# Patient Record
Sex: FEMALE | Race: BLACK OR AFRICAN AMERICAN | NOT HISPANIC OR LATINO | URBAN - METROPOLITAN AREA
[De-identification: names, ages, dates, MRNs, and addresses within clinical notes are randomized per-mention and may not be internally consistent; named-entity substitution may affect disease eponyms.]

---

## 2021-08-09 ENCOUNTER — EMERGENCY (EMERGENCY)
Facility: HOSPITAL | Age: 41
LOS: 1 days | Discharge: HOME | End: 2021-08-09
Attending: STUDENT IN AN ORGANIZED HEALTH CARE EDUCATION/TRAINING PROGRAM
Payer: COMMERCIAL

## 2021-08-09 VITALS
TEMPERATURE: 98 F | SYSTOLIC BLOOD PRESSURE: 103 MMHG | RESPIRATION RATE: 16 BRPM | DIASTOLIC BLOOD PRESSURE: 70 MMHG | WEIGHT: 139.99 LBS | HEIGHT: 62 IN | HEART RATE: 70 BPM | OXYGEN SATURATION: 100 %

## 2021-08-09 VITALS
DIASTOLIC BLOOD PRESSURE: 67 MMHG | RESPIRATION RATE: 16 BRPM | OXYGEN SATURATION: 99 % | HEART RATE: 58 BPM | SYSTOLIC BLOOD PRESSURE: 102 MMHG | TEMPERATURE: 99 F

## 2021-08-09 LAB
ALBUMIN SERPL ELPH-MCNC: 4 G/DL — SIGNIFICANT CHANGE UP (ref 3.3–5)
ALP SERPL-CCNC: 56 U/L — SIGNIFICANT CHANGE UP (ref 40–120)
ALT FLD-CCNC: 9 U/L — LOW (ref 10–45)
ANION GAP SERPL CALC-SCNC: 14 MMOL/L — SIGNIFICANT CHANGE UP (ref 5–17)
APPEARANCE UR: CLEAR — SIGNIFICANT CHANGE UP
AST SERPL-CCNC: 18 U/L — SIGNIFICANT CHANGE UP (ref 10–40)
BASOPHILS # BLD AUTO: 0.03 K/UL — SIGNIFICANT CHANGE UP (ref 0–0.2)
BASOPHILS NFR BLD AUTO: 0.4 % — SIGNIFICANT CHANGE UP (ref 0–2)
BILIRUB SERPL-MCNC: 0.6 MG/DL — SIGNIFICANT CHANGE UP (ref 0.2–1.2)
BILIRUB UR-MCNC: NEGATIVE — SIGNIFICANT CHANGE UP
BUN SERPL-MCNC: 7 MG/DL — SIGNIFICANT CHANGE UP (ref 7–23)
CALCIUM SERPL-MCNC: 9.3 MG/DL — SIGNIFICANT CHANGE UP (ref 8.4–10.5)
CHLORIDE SERPL-SCNC: 104 MMOL/L — SIGNIFICANT CHANGE UP (ref 96–108)
CO2 SERPL-SCNC: 19 MMOL/L — LOW (ref 22–31)
COLOR SPEC: SIGNIFICANT CHANGE UP
CREAT SERPL-MCNC: 0.72 MG/DL — SIGNIFICANT CHANGE UP (ref 0.5–1.3)
D DIMER BLD IA.RAPID-MCNC: 411 NG/ML DDU — HIGH
DIFF PNL FLD: NEGATIVE — SIGNIFICANT CHANGE UP
EOSINOPHIL # BLD AUTO: 0.05 K/UL — SIGNIFICANT CHANGE UP (ref 0–0.5)
EOSINOPHIL NFR BLD AUTO: 0.7 % — SIGNIFICANT CHANGE UP (ref 0–6)
GLUCOSE SERPL-MCNC: 82 MG/DL — SIGNIFICANT CHANGE UP (ref 70–99)
GLUCOSE UR QL: NEGATIVE — SIGNIFICANT CHANGE UP
HCG UR QL: NEGATIVE — SIGNIFICANT CHANGE UP
HCT VFR BLD CALC: 44.8 % — SIGNIFICANT CHANGE UP (ref 34.5–45)
HGB BLD-MCNC: 14.3 G/DL — SIGNIFICANT CHANGE UP (ref 11.5–15.5)
IMM GRANULOCYTES NFR BLD AUTO: 0.3 % — SIGNIFICANT CHANGE UP (ref 0–1.5)
KETONES UR-MCNC: NEGATIVE — SIGNIFICANT CHANGE UP
LEUKOCYTE ESTERASE UR-ACNC: NEGATIVE — SIGNIFICANT CHANGE UP
LYMPHOCYTES # BLD AUTO: 2.34 K/UL — SIGNIFICANT CHANGE UP (ref 1–3.3)
LYMPHOCYTES # BLD AUTO: 33.3 % — SIGNIFICANT CHANGE UP (ref 13–44)
MAGNESIUM SERPL-MCNC: 2.2 MG/DL — SIGNIFICANT CHANGE UP (ref 1.6–2.6)
MCHC RBC-ENTMCNC: 28.3 PG — SIGNIFICANT CHANGE UP (ref 27–34)
MCHC RBC-ENTMCNC: 31.9 GM/DL — LOW (ref 32–36)
MCV RBC AUTO: 88.5 FL — SIGNIFICANT CHANGE UP (ref 80–100)
MONOCYTES # BLD AUTO: 0.34 K/UL — SIGNIFICANT CHANGE UP (ref 0–0.9)
MONOCYTES NFR BLD AUTO: 4.8 % — SIGNIFICANT CHANGE UP (ref 2–14)
NEUTROPHILS # BLD AUTO: 4.25 K/UL — SIGNIFICANT CHANGE UP (ref 1.8–7.4)
NEUTROPHILS NFR BLD AUTO: 60.5 % — SIGNIFICANT CHANGE UP (ref 43–77)
NITRITE UR-MCNC: NEGATIVE — SIGNIFICANT CHANGE UP
NRBC # BLD: 0 /100 WBCS — SIGNIFICANT CHANGE UP (ref 0–0)
PH UR: 7 — SIGNIFICANT CHANGE UP (ref 5–8)
PLATELET # BLD AUTO: 159 K/UL — SIGNIFICANT CHANGE UP (ref 150–400)
POTASSIUM SERPL-MCNC: 4.6 MMOL/L — SIGNIFICANT CHANGE UP (ref 3.5–5.3)
POTASSIUM SERPL-SCNC: 4.6 MMOL/L — SIGNIFICANT CHANGE UP (ref 3.5–5.3)
PROT SERPL-MCNC: 7.6 G/DL — SIGNIFICANT CHANGE UP (ref 6–8.3)
PROT UR-MCNC: NEGATIVE — SIGNIFICANT CHANGE UP
RBC # BLD: 5.06 M/UL — SIGNIFICANT CHANGE UP (ref 3.8–5.2)
RBC # FLD: 12.5 % — SIGNIFICANT CHANGE UP (ref 10.3–14.5)
SODIUM SERPL-SCNC: 137 MMOL/L — SIGNIFICANT CHANGE UP (ref 135–145)
SP GR SPEC: 1.01 — SIGNIFICANT CHANGE UP (ref 1.01–1.02)
TROPONIN T, HIGH SENSITIVITY RESULT: <6 NG/L — SIGNIFICANT CHANGE UP (ref 0–51)
TSH SERPL-MCNC: 0.87 UIU/ML — SIGNIFICANT CHANGE UP (ref 0.27–4.2)
UROBILINOGEN FLD QL: NEGATIVE — SIGNIFICANT CHANGE UP
WBC # BLD: 7.03 K/UL — SIGNIFICANT CHANGE UP (ref 3.8–10.5)
WBC # FLD AUTO: 7.03 K/UL — SIGNIFICANT CHANGE UP (ref 3.8–10.5)

## 2021-08-09 PROCEDURE — G1004: CPT

## 2021-08-09 PROCEDURE — 71275 CT ANGIOGRAPHY CHEST: CPT | Mod: ME

## 2021-08-09 PROCEDURE — 83735 ASSAY OF MAGNESIUM: CPT

## 2021-08-09 PROCEDURE — 85025 COMPLETE CBC W/AUTO DIFF WBC: CPT

## 2021-08-09 PROCEDURE — 81003 URINALYSIS AUTO W/O SCOPE: CPT

## 2021-08-09 PROCEDURE — 84443 ASSAY THYROID STIM HORMONE: CPT

## 2021-08-09 PROCEDURE — 85379 FIBRIN DEGRADATION QUANT: CPT

## 2021-08-09 PROCEDURE — 84484 ASSAY OF TROPONIN QUANT: CPT

## 2021-08-09 PROCEDURE — 93005 ELECTROCARDIOGRAM TRACING: CPT

## 2021-08-09 PROCEDURE — 99285 EMERGENCY DEPT VISIT HI MDM: CPT

## 2021-08-09 PROCEDURE — 71045 X-RAY EXAM CHEST 1 VIEW: CPT

## 2021-08-09 PROCEDURE — 71275 CT ANGIOGRAPHY CHEST: CPT | Mod: 26,ME

## 2021-08-09 PROCEDURE — 81025 URINE PREGNANCY TEST: CPT

## 2021-08-09 PROCEDURE — 71045 X-RAY EXAM CHEST 1 VIEW: CPT | Mod: 26

## 2021-08-09 PROCEDURE — 99284 EMERGENCY DEPT VISIT MOD MDM: CPT | Mod: 25

## 2021-08-09 PROCEDURE — 80053 COMPREHEN METABOLIC PANEL: CPT

## 2021-08-09 PROCEDURE — 93010 ELECTROCARDIOGRAM REPORT: CPT

## 2021-08-09 RX ORDER — RIVAROXABAN 15 MG-20MG
1 KIT ORAL
Qty: 42 | Refills: 0
Start: 2021-08-09 | End: 2021-08-29

## 2021-08-09 RX ADMIN — Medication 30 MILLILITER(S): at 13:26

## 2021-08-09 NOTE — ED ADULT NURSE NOTE - OBJECTIVE STATEMENT
42 yo presents to the ED from home. A&Ox4, ambulatory c/o dizziness, chest pain, palpitations with SOB x2 weeks, worsening the last 2 days. pt reports numerous episodes per day. pt reports symptoms present at rest and they are relieved on its own. pt reports R shoulder pain as well. pt denies trauma. no cardiac history. pt well appearing. 20G RAC. Patient undressed and placed into gown, call bell in hand and side rails up for safety. warm blanket provided, vital signs stable, pt in no acute distress.

## 2021-08-09 NOTE — ED PROVIDER NOTE - NSFOLLOWUPINSTRUCTIONS_ED_ALL_ED_FT
You were seen in the Emergency Department for chest pain and tachycardia. You were found to have multiple small blood clots in your lungs, which may be related to your oral contraceptive use, we started you on a blood thinner. Please continue to take the xarelto two times a day for the next 3 weeks. Your hematology doctor will adjust your dose.     1) Advance activity as tolerated.   2) Continue all previously prescribed medications as directed.    3) Follow up with your primary care physician and hematology doctor in 24-48 hours - take copies of your results. The discharge [id number] will call you te establish follow up appointments with the blood doctors and surgeons.  4) Return to the Emergency Department for worsening or persistent symptoms, and/or ANY NEW OR CONCERNING SYMPTOMS.

## 2021-08-09 NOTE — ED PROVIDER NOTE - NSPTACCESSSVCSAPPTDETAILS_ED_ALL_ED_FT
URGENT Hematology for multiple subsegmental PEs now on xarelto, and URGENT Vascular Surgery f/u for CT findings concerning for median arcuate ligament syndrome

## 2021-08-09 NOTE — ED PROVIDER NOTE - PROVIDER TOKENS
PROVIDER:[TOKEN:[[id number]],FOLLOWUP:[4-6 Days],ESTABLISHEDPATIENT:[T]] FREE:[LAST:[Chambers],FIRST:[Mary],PHONE:[phone]],FAX:[(   )    -],ADDRESS:[[address] [place] [address]],FOLLOWUP:[1-3 Days],ESTABLISHEDPATIENT:[T]]

## 2021-08-09 NOTE — ED PROVIDER NOTE - PROGRESS NOTE DETAILS
Gurmeet CRUZ: Pt with 2x subseg PE on CTA. 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 Findings discussed with patient, low risk by PESI, agreeable with plan for xarelto as outpt and close hematology follow up. Will f/u with vasc surgery for MALS. Gurmeet CRUZ: Pt with 2x subseg PE on CTA. 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 Findings discussed with patient, low risk by PESI, agreeable with plan for xarelto as outpt and close hematology follow up. Will f/u with vasc surgery for MALS.    Patient was given strict return precautions and told to return to ED or seek medical care if she developed any shortness of breath, worsening chest pain, worsening lightheadedness/dizziness, abdominal pain, vomiting, fevers, chills OR ANY OTHER CONCERNING SYMPTOMS. close follow up with hematology and her PCP was stressed and patient voiced understanding.

## 2021-08-09 NOTE — ED PROVIDER NOTE - CLINICAL SUMMARY MEDICAL DECISION MAKING FREE TEXT BOX
HORACE Ko, Attending: reviewed resident note.    Healthy 41 yoF, presenting with 2 weeks of chest pain, palpitations, dizziness, dyspnea. Sx intermittent. No obvious triggers. Resolve on own. Never had before. No smoking hx. No hx of VTE. No travel. Does take OCPs. Saw her PCP in June, no issues. No fever, cough, recent illness. Not Covid vaccinated, no prior hx of Covid illness.     vitals reassuring, EKG reassuring without concerning ST morphology   lungs cta, rrr, non lateralizing neuro exam, abd soft, non tender    doubt emergent pathology but will screen for arrythmia, ACS, PTX, PE (w/ dimer given OCPS). Possibly GI source.  Suspect ok for outpatient follow up with PCP if workup non actionable.

## 2021-08-09 NOTE — ED PROVIDER NOTE - PATIENT PORTAL LINK FT
You can access the FollowMyHealth Patient Portal offered by [facility] by registering at the following website: [email or web address]. By joining [employer]’s FollowMyHealth portal, you will also be able to view your health information using other applications (apps) compatible with our system.

## 2021-08-09 NOTE — ED PROVIDER NOTE - WR ORDER DATE AND TIME 1
Pt to ED c/o constipation x2 days. Pt c/o constant upper left abdominal pain and flank pain. Pt denies N/V. Bowel sounds auscultated in all quadrants. Pt a/o x4,  ambulatory, skin intact  
09-Aug-2021 12:53

## 2021-08-09 NOTE — ED PROVIDER NOTE - NS ED ROS FT
Gen: see HPI  Eyes: No eye irritation or discharge  ENT: No ear pain, congestion, sore throat  Resp: see HPI  Cardiovascular: see HPI  Gastroenteric: No nausea/vomiting, diarrhea, constipation  :  No change in urine output; no dysuria  MS: No joint or muscle pain  Skin: No rashes  Neuro: No headache; no abnormal movements  Remainder negative, except as per the HPI

## 2021-08-09 NOTE — ED PROVIDER NOTE - PHYSICAL EXAMINATION
PHYSICAL EXAM:  GENERAL: Sitting comfortable in bed, in no acute distress  HENMT: Atraumatic, moist mucous membranes, no oropharyngeal exudates or vesicles, uvula is midline EYES: Clear bilaterally, PERRL, EOMs intact b/l  HEART: RRR, S1/S2, no murmur/gallops/rubs  RESPIRATORY: Clear to auscultation bilaterally, no wheezes/rhonchi/rales  ABDOMEN: +BS, soft, nontender, nondistended  EXTREMITIES: No lower extremity edema, +2 radial pulses b/l  NEURO:  A&Ox4, no focal motor deficits or sensory deficits   Heme/LYMPH: No ecchymosis or bruising, no anterior/posterior cervical or supraclavicular LAD  SKIN:  Skin normal color for race, warm, dry and intact. No evidence of rash.

## 2021-08-09 NOTE — ED PROVIDER NOTE - CARE PROVIDER_API CALL
ASHIA GREWAL  [id number]  [address] [place] [address]  Phone: [phone]  Fax: [phone]  Established Patient  Follow Up Time: 4-6 Days   Mary Chambers  [address] [place] [address]  Phone: [phone]  Fax: (   )    -  Established Patient  Follow Up Time: 1-3 Days

## 2021-08-16 ENCOUNTER — TRANSCRIPTION ENCOUNTER (OUTPATIENT)
Age: 41
End: 2021-08-16

## 2021-08-20 PROBLEM — Z00.00 ENCOUNTER FOR PREVENTIVE HEALTH EXAMINATION: Status: ACTIVE | Noted: 2021-08-20

## 2021-08-29 ENCOUNTER — EMERGENCY (EMERGENCY)
Facility: HOSPITAL | Age: 41
LOS: 1 days | Discharge: HOME | End: 2021-08-29
Attending: STUDENT IN AN ORGANIZED HEALTH CARE EDUCATION/TRAINING PROGRAM
Payer: COMMERCIAL

## 2021-08-29 VITALS
SYSTOLIC BLOOD PRESSURE: 99 MMHG | OXYGEN SATURATION: 100 % | DIASTOLIC BLOOD PRESSURE: 57 MMHG | WEIGHT: 138.01 LBS | TEMPERATURE: 98 F | HEART RATE: 70 BPM | HEIGHT: 62 IN | RESPIRATION RATE: 18 BRPM

## 2021-08-29 LAB
ALBUMIN SERPL ELPH-MCNC: 3.8 G/DL — SIGNIFICANT CHANGE UP (ref 3.3–5)
ALP SERPL-CCNC: 44 U/L — SIGNIFICANT CHANGE UP (ref 40–120)
ALT FLD-CCNC: 7 U/L — LOW (ref 10–45)
ANION GAP SERPL CALC-SCNC: 13 MMOL/L — SIGNIFICANT CHANGE UP (ref 5–17)
APTT BLD: 35.1 SEC — SIGNIFICANT CHANGE UP (ref 27.5–35.5)
AST SERPL-CCNC: 17 U/L — SIGNIFICANT CHANGE UP (ref 10–40)
BASOPHILS # BLD AUTO: 0.03 K/UL — SIGNIFICANT CHANGE UP (ref 0–0.2)
BASOPHILS NFR BLD AUTO: 0.5 % — SIGNIFICANT CHANGE UP (ref 0–2)
BILIRUB SERPL-MCNC: 0.3 MG/DL — SIGNIFICANT CHANGE UP (ref 0.2–1.2)
BLD GP AB SCN SERPL QL: NEGATIVE — SIGNIFICANT CHANGE UP
BUN SERPL-MCNC: 8 MG/DL — SIGNIFICANT CHANGE UP (ref 7–23)
CALCIUM SERPL-MCNC: 9.2 MG/DL — SIGNIFICANT CHANGE UP (ref 8.4–10.5)
CHLORIDE SERPL-SCNC: 106 MMOL/L — SIGNIFICANT CHANGE UP (ref 96–108)
CO2 SERPL-SCNC: 20 MMOL/L — LOW (ref 22–31)
CREAT SERPL-MCNC: 0.76 MG/DL — SIGNIFICANT CHANGE UP (ref 0.5–1.3)
EOSINOPHIL # BLD AUTO: 0.13 K/UL — SIGNIFICANT CHANGE UP (ref 0–0.5)
EOSINOPHIL NFR BLD AUTO: 2.1 % — SIGNIFICANT CHANGE UP (ref 0–6)
GLUCOSE SERPL-MCNC: 84 MG/DL — SIGNIFICANT CHANGE UP (ref 70–99)
HCG SERPL-ACNC: <2 MIU/ML — SIGNIFICANT CHANGE UP
HCT VFR BLD CALC: 31.5 % — LOW (ref 34.5–45)
HCT VFR BLD CALC: 33.1 % — LOW (ref 34.5–45)
HGB BLD-MCNC: 10.1 G/DL — LOW (ref 11.5–15.5)
HGB BLD-MCNC: 10.6 G/DL — LOW (ref 11.5–15.5)
IMM GRANULOCYTES NFR BLD AUTO: 0.3 % — SIGNIFICANT CHANGE UP (ref 0–1.5)
INR BLD: 2.75 RATIO — HIGH (ref 0.88–1.16)
LYMPHOCYTES # BLD AUTO: 2.09 K/UL — SIGNIFICANT CHANGE UP (ref 1–3.3)
LYMPHOCYTES # BLD AUTO: 33 % — SIGNIFICANT CHANGE UP (ref 13–44)
MCHC RBC-ENTMCNC: 28.1 PG — SIGNIFICANT CHANGE UP (ref 27–34)
MCHC RBC-ENTMCNC: 28.2 PG — SIGNIFICANT CHANGE UP (ref 27–34)
MCHC RBC-ENTMCNC: 32 GM/DL — SIGNIFICANT CHANGE UP (ref 32–36)
MCHC RBC-ENTMCNC: 32.1 GM/DL — SIGNIFICANT CHANGE UP (ref 32–36)
MCV RBC AUTO: 87.5 FL — SIGNIFICANT CHANGE UP (ref 80–100)
MCV RBC AUTO: 88 FL — SIGNIFICANT CHANGE UP (ref 80–100)
MONOCYTES # BLD AUTO: 0.46 K/UL — SIGNIFICANT CHANGE UP (ref 0–0.9)
MONOCYTES NFR BLD AUTO: 7.3 % — SIGNIFICANT CHANGE UP (ref 2–14)
NEUTROPHILS # BLD AUTO: 3.6 K/UL — SIGNIFICANT CHANGE UP (ref 1.8–7.4)
NEUTROPHILS NFR BLD AUTO: 56.8 % — SIGNIFICANT CHANGE UP (ref 43–77)
NRBC # BLD: 0 /100 WBCS — SIGNIFICANT CHANGE UP (ref 0–0)
NRBC # BLD: 0 /100 WBCS — SIGNIFICANT CHANGE UP (ref 0–0)
PLATELET # BLD AUTO: 208 K/UL — SIGNIFICANT CHANGE UP (ref 150–400)
PLATELET # BLD AUTO: 210 K/UL — SIGNIFICANT CHANGE UP (ref 150–400)
POTASSIUM SERPL-MCNC: 4.6 MMOL/L — SIGNIFICANT CHANGE UP (ref 3.5–5.3)
POTASSIUM SERPL-SCNC: 4.6 MMOL/L — SIGNIFICANT CHANGE UP (ref 3.5–5.3)
PROT SERPL-MCNC: 6.8 G/DL — SIGNIFICANT CHANGE UP (ref 6–8.3)
PROTHROM AB SERPL-ACNC: 31.4 SEC — HIGH (ref 10.6–13.6)
RBC # BLD: 3.6 M/UL — LOW (ref 3.8–5.2)
RBC # BLD: 3.76 M/UL — LOW (ref 3.8–5.2)
RBC # FLD: 12.2 % — SIGNIFICANT CHANGE UP (ref 10.3–14.5)
RBC # FLD: 12.4 % — SIGNIFICANT CHANGE UP (ref 10.3–14.5)
RH IG SCN BLD-IMP: POSITIVE — SIGNIFICANT CHANGE UP
SARS-COV-2 RNA SPEC QL NAA+PROBE: SIGNIFICANT CHANGE UP
SODIUM SERPL-SCNC: 139 MMOL/L — SIGNIFICANT CHANGE UP (ref 135–145)
WBC # BLD: 5.15 K/UL — SIGNIFICANT CHANGE UP (ref 3.8–10.5)
WBC # BLD: 6.33 K/UL — SIGNIFICANT CHANGE UP (ref 3.8–10.5)
WBC # FLD AUTO: 5.15 K/UL — SIGNIFICANT CHANGE UP (ref 3.8–10.5)
WBC # FLD AUTO: 6.33 K/UL — SIGNIFICANT CHANGE UP (ref 3.8–10.5)

## 2021-08-29 PROCEDURE — 93975 VASCULAR STUDY: CPT | Mod: 26

## 2021-08-29 PROCEDURE — 99218: CPT

## 2021-08-29 PROCEDURE — 93010 ELECTROCARDIOGRAM REPORT: CPT

## 2021-08-29 PROCEDURE — 76830 TRANSVAGINAL US NON-OB: CPT | Mod: 26

## 2021-08-29 RX ORDER — KETOROLAC TROMETHAMINE 30 MG/ML
15 SYRINGE (ML) INJECTION ONCE
Refills: 0 | Status: DISCONTINUED | OUTPATIENT
Start: 2021-08-29 | End: 2021-08-29

## 2021-08-29 RX ORDER — ACETAMINOPHEN 500 MG
975 TABLET ORAL EVERY 6 HOURS
Refills: 0 | Status: DISCONTINUED | OUTPATIENT
Start: 2021-08-29 | End: 2021-09-02

## 2021-08-29 RX ORDER — RIVAROXABAN 15 MG-20MG
15 KIT ORAL
Refills: 0 | Status: DISCONTINUED | OUTPATIENT
Start: 2021-08-29 | End: 2021-09-02

## 2021-08-29 RX ORDER — SODIUM CHLORIDE 9 MG/ML
1000 INJECTION INTRAMUSCULAR; INTRAVENOUS; SUBCUTANEOUS ONCE
Refills: 0 | Status: COMPLETED | OUTPATIENT
Start: 2021-08-29 | End: 2021-08-29

## 2021-08-29 RX ORDER — ACETAMINOPHEN 500 MG
975 TABLET ORAL ONCE
Refills: 0 | Status: COMPLETED | OUTPATIENT
Start: 2021-08-29 | End: 2021-08-29

## 2021-08-29 RX ADMIN — Medication 975 MILLIGRAM(S): at 15:53

## 2021-08-29 RX ADMIN — SODIUM CHLORIDE 1000 MILLILITER(S): 9 INJECTION INTRAMUSCULAR; INTRAVENOUS; SUBCUTANEOUS at 15:53

## 2021-08-29 RX ADMIN — RIVAROXABAN 15 MILLIGRAM(S): KIT at 22:07

## 2021-08-29 RX ADMIN — Medication 975 MILLIGRAM(S): at 19:00

## 2021-08-29 RX ADMIN — SODIUM CHLORIDE 1000 MILLILITER(S): 9 INJECTION INTRAMUSCULAR; INTRAVENOUS; SUBCUTANEOUS at 19:00

## 2021-08-29 NOTE — ED CDU PROVIDER DISPOSITION NOTE - PATIENT PORTAL LINK FT
You can access the FollowMyHealth Patient Portal offered by [facility] by registering at the following website: [email or web address]. By joining [employer]’s FollowMyHealth portal, you will also be able to view your health information using other applications (apps) compatible with our system.

## 2021-08-29 NOTE — ED CDU PROVIDER DISPOSITION NOTE - CLINICAL COURSE
41 year old female with PMHx recently diagnosed PE (8/9/2021) on Xarelto presents to ED with heavy vaginal bleeding for 3 weeks which has been gradually worsening. Pt reports over past day has had to change pad every 2 hours and has been passing very large blood clots. She also endorses intermittent abd cramping. Earlier today  around 12pm  pt reports syncopal episode as well describing she passed out after BM while sitting a toilet and fell down on the floor. Prior to event states she felt light headed sensation and intermittent abd cramping.  Denies injury or pain related to fall. Pt stated she had LMP-11/2020 and started birth control pill in March 2021 for premenopause symptoms. She is still taking her OCP despite having the PE.  Denies fevers, chills, HA, chest pain, sob, n/v/d. PMD- Dr. Mary Chambers   GYN- Dr. Gisell Mari  In ED pt H/H 10.6/33.1 from 14.3/44.8 on 8/9. PT 31.4 and INR 2.75. ED consulted GYN given pt needs a/c and is actively bleeding with drop in H/H. Ordered for TVUS for further eval. Pt accepted to CDU to trend H/H and for further recommendations with GYN  IN CDU *** 41 year old female with PMHx recently diagnosed PE (8/9/2021) on Xarelto presents to ED with heavy vaginal bleeding for 3 weeks which has been gradually worsening. Pt reports over past day has had to change pad every 2 hours and has been passing very large blood clots. She also endorses intermittent abd cramping. Earlier today  around 12pm  pt reports syncopal episode as well describing she passed out after BM while sitting a toilet and fell down on the floor. Prior to event states she felt light headed sensation and intermittent abd cramping.  Denies injury or pain related to fall. Pt stated she had LMP-11/2020 and started birth control pill in March 2021 for premenopause symptoms. She is still taking her OCP despite having the PE.  Denies fevers, chills, HA, chest pain, sob, n/v/d. PMD- Dr. Mary Chambers   GYN- Dr. Gisell Mari  In ED pt H/H 10.6/33.1 from 14.3/44.8 on 8/9. PT 31.4 and INR 2.75. ED consulted GYN given pt needs a/c and is actively bleeding with drop in H/H. Ordered for TVUS for further eval. Pt accepted to CDU to trend H/H and for further recommendations with GYN.    Patient with stable labs and vitals despite persistent vaginal bleeding while in CDU (saturating approx 1 pad every 3 hours). Spoke with OBGYN, no acute intervention, but recommending to d/c estrogen containing OCP use. Pt with both endo and heme f/u this week, and has a private GYN to f/u with. Pt reports she will f/u for iron infusions which she has done in the past. Pt stable for d/c.

## 2021-08-29 NOTE — ED ADULT NURSE REASSESSMENT NOTE - NS ED NURSE REASSESS COMMENT FT1
Pt states that she took her first dose of Xarelto at 1200 noon. Pt requesting her second dose for a later time. Pt placed in position of comfort. Pt educated on call bell system and provided call bell. Bed in lowest position, wheels locked, appropriate side rails raised. Pt denies needs at this time.

## 2021-08-29 NOTE — ED CDU PROVIDER DISPOSITION NOTE - NSFOLLOWUPINSTRUCTIONS_ED_ALL_ED_FT
1. Rest and stay hydrated and continue home Xarelto as prescribed     2. Continue follow up with Hematology as previously scheduled Wednesday 9/1/2021 for further recommendations in regards to your Xarelto and further management of your blood clots     3. Per GYN, STOP your current birth control. We recommend follow up  outpatient with OB/GYN for alternative treatments of perimenopausal symptoms and reducing menstrual blood flow in the setting of Xarelto     4. Take Iron supplement: Ferrous Sulfate 325mg 2x/day with Stool softener (also 2x/day)    5. 1. Rest and stay hydrated and continue home Xarelto as prescribed     2. Continue follow up with Hematology as previously scheduled Wednesday 9/1/2021 for further recommendations in regards to your Xarelto and further management of your blood clots     3. Per GYN, STOP your current birth control. We recommend follow up  outpatient with OB/GYN for alternative treatments of perimenopausal symptoms and reducing menstrual blood flow in the setting of Xarelto     4. Start Iron supplementation and take as prescribed     5. Return to ED for change of symptoms including continued heavy vaginal bleeding soak >2 pads/hr) Rest and stay hydrated.  Continue Xarelto as prescribed.  Please STOP estrogen-containing oral contraceptive.     Please follow up with Hematology and Endocrinology at your scheduled appointments this week for further evaluation and management.      Please also follow up outpatient with your OBGYN for alternative treatments of perimenopausal symptoms and reducing menstrual blood flow in the setting of Xarelto.    Start Iron supplementation as discussed.     Return to the ER for any new/worsening dizziness, passing out, chest pain, shortness of breath, continued heavy vaginal bleeding (using more than 2 pads per hour for more than 2 hours), or any other concerning symptoms.

## 2021-08-29 NOTE — ED PROVIDER NOTE - PROGRESS NOTE DETAILS
Attending MD Burgess : Drop in Hgb noted. Given patient is having persistent bleeding on AC that is required, will c/s GYN. patient will need admission given dropping H&H. GYN at bedside.

## 2021-08-29 NOTE — CONSULT NOTE ADULT - SUBJECTIVE AND OBJECTIVE BOX
GYN Consult Note    HPI:  41y  LMP 2020 (and Aug 9th after starting Xarelto for PE presents with heavy vaginal bleeding for the past 3 weeks. The patient reports changing her pad q2 hours and passing a clot in the toilet. She had a vasovagal episode where she felt dizzy on the toilet, denies loss of consciousness or fall. Patient was started on OCPs on March by her Endocrinologist for symptomatic management of perimenopausal symptoms (hot flashes and night sweats). She then was seen on 2021 for SOB/CP and diagnosed with a PE and started on Xarelto which precipitated this vaginal bleeding. Reports intermittent dizziness, lightheadedness, and palpitations throughout the day today. Denies fevers, CP, SOB, abdominal pain and vaginal discharge    OB/GYN HISTORY:   G1: eTOP  s/p D+C  G2:      Last Menstrual Period: 2020, most recent Aug 8- current. See H&P    Name of GYN Physician: Dr. Ruth Mari ([facility])  +Fibroids s/p myomectomy. Denies STIs, cysts, abnormal paps     PAST MEDICAL & SURGICAL HISTORY:  PE Aug 2021 provoked by OCP use  Myomectomy  D+C    REVIEW OF SYSTEMS  General: +lightheadedness, denies fevers, chills, tiredness  Skin/Breast: denies breast pain  Respiratory and Thorax: denies shortness of breath, denies cough  Cardiovascular: denies chest pain and + palpitations  Gastrointestinal: denies abdominal pain, nausea/ vomiting	  Genitourinary: denies dysuria, increased urinary frequency, urgency	  Constitutional, Cardiovascular, Respiratory, Gastrointestinal, Genitourinary, Musculoskeletal and Integumentary review of systems are normal except as noted. 	    Meds (home)  - Xarelto 15mg BID  - OCPs    MEDICATIONS  (STANDING):  rivaroxaban 15 milliGRAM(s) Oral two times a day with meals    MEDICATIONS  (PRN):  acetaminophen   Tablet .. 975 milliGRAM(s) Oral every 6 hours PRN Mild Pain (1 - 3)      Allergies  Reglan (Urticaria)    SOCIAL HISTORY: Denies tobacco use, drug use, alcohol, anxiety and depression     FAMILY HISTORY: No family h/o clots      Vital Signs Last 24 Hrs  T(C): 36.8 (29 Aug 2021 15:25), Max: 36.9 (29 Aug 2021 14:57)  T(F): 98.3 (29 Aug 2021 15:25), Max: 98.4 (29 Aug 2021 14:57)  HR: 66 (29 Aug 2021 15:25) (66 - 70)  BP: 124/69 (29 Aug 2021 15:25) (99/57 - 124/69)  BP(mean): --  RR: 16 (29 Aug 2021 15:25) (16 - 18)  SpO2: 98% (29 Aug 2021 15:25) (98% - 100%)    PHYSICAL EXAM:   Gen: NAD, alert and oriented x 3  Cardiovascular: regular   Respiratory: breathing comfortably on RA  Abd: soft, non tender, non-distended, no rebound   : Minimal spotting on pad (last changed 1.5 hours ago)  Pelvic: closed/long, no CMT, moderate dark red blood clot in posterior fornix on speculum exam Uterus: normal size, non tender  Adnexa: non tender, no palpable masses  Extremities: NTBL  Skin: warm and well perfused      LABS:                        10.6   6.33  )-----------( 210      ( 29 Aug 2021 16:00 )             33.1     08-29    139  |  106  |  8   ----------------------------<  84  4.6   |  20<L>  |  0.76    Ca    9.2      29 Aug 2021 16:00    TPro  6.8  /  Alb  3.8  /  TBili  0.3  /  DBili  x   /  AST  17  /  ALT  7<L>  /  AlkPhos  44  08-29    PT/INR - ( 29 Aug 2021 16:00 )   PT: 31.4 sec;   INR: 2.75 ratio         PTT - ( 29 Aug 2021 16:00 )  PTT:35.1 sec      RADIOLOGY & ADDITIONAL STUDIES:

## 2021-08-29 NOTE — ED CDU PROVIDER INITIAL DAY NOTE - PROGRESS NOTE DETAILS
CDU PROGRESS NOTE JAMARI ARAUJO: Received pt at sign-out. Case/plan reviewed. VSS. TVUS showed Heterogeneous endometrium containing blood products that demonstrate no vascularity. Fibroid uterus.  No evidence of ovarian torsion. Pt resting in stretcher in NAD. Abdomen soft, nontender, no rebound or guarding. Pt reports mild cramping, but states bleeding has decreased. Pt reports changing pad x1 2-3 hours ago, not saturated w/ clots. Pt declines analgesia now, will continue to monitor overnight. CDU PROGRESS NOTE JAMARI ARAUJO: Received pt at sign-out. Case/plan reviewed. VSS. TVUS showed Heterogeneous endometrium containing blood products that demonstrate no vascularity. Fibroid uterus. No evidence of ovarian torsion. Pt resting in stretcher in NAD. Abdomen soft, nontender, no rebound or guarding. Pt reports mild cramping, but states bleeding has decreased. Pt reports changing pad x1 2-3 hours ago, not saturated w/ clots. Pt declines analgesia now, will continue to monitor overnight.

## 2021-08-29 NOTE — ED ADULT NURSE REASSESSMENT NOTE - NS ED NURSE REASSESS COMMENT FT1
Pt received from GEORGETTE Serra. Pt oriented to CDU & plan of care was discussed. Pt states she feels the same. Pt endorses generalized weakness and fatigue and dizziness. Pt aware to notify RN or PA of any lightheadedness/ dizziness prior to ambulation. Pt ambulated with PA Lemuel without any difficulty. Pt states she is changing her pad less frequently and it is not soaking. Safety & comfort measures maintained. Call bell in reach. Will continue to monitor. Pt received from GEORGETTE Serra. Pt oriented to CDU & plan of care was discussed. Pt states she feels the same. Pt endorses generalized weakness and fatigue and dizziness. Pt aware to notify RN or PA of any lightheadedness/ dizziness prior to ambulation. Pt ambulated with PA Lemuel without any difficulty. Pt states she is changing her pad less frequently and it is not soaking. Pt denies any SOB or difficulty breathing. Safety & comfort measures maintained. Call bell in reach. Will continue to monitor.

## 2021-08-29 NOTE — ED PROVIDER NOTE - PHYSICAL EXAMINATION
Attending MD Burgess :   PHYSICAL EXAM:    GENERAL: NAD  HEENT:  Atraumatic  CHEST/LUNG: Chest rise equal bilaterally. CTAB.   HEART: Regular rate and rhythm. No murmurs or rubs.   ABDOMEN: Mild suprapubic TTP.   : performed by NP Rosette Hooker. Clots in vaginal vault obscuring full view of os, but no active bleeding into vaginal vault noted.    EXTREMITIES:  Extremities warm  PSYCH: A&Ox3  SKIN: No obvious rashes or lesions

## 2021-08-29 NOTE — ED CDU PROVIDER INITIAL DAY NOTE - OBJECTIVE STATEMENT
41 year old female with PMHx recently diagnosed PE (8/9/2021) on Xarelto presents to ED with heavy vaginal bleeding for 3 weeks and gradual worsening vaginal bleeding (changing pad every 2 hours) with intermittent abd cramping. She noticed headache, dizziness, intermittent abd cramping, and syncopal episode for 1-2minutes at 12pm today. She passed out for 1-2minutes after BM around midday while sitting a toilet and fell down on the floor. Her family heard her fall and helped her out. Denies injury or pain. No headache, neck pain, weakness or numbness. Pt stated she had LMP-11/2020 and started birth control pill in March 2021 for premenopause symptoms. Interestingly, she is still taking her OCP despite having the PE. She had episodes of SOB 3 weeks ago and diagnosed PE in ED.  Denies n/v/d. no cp/sob. no fever/chills.   PMD- Dr. Mary Chambers   GYN- Dr. Gisell Mari    In ED 41 year old female with PMHx recently diagnosed PE (8/9/2021) on Xarelto presents to ED with heavy vaginal bleeding for 3 weeks which has been gradually worsening. Pt reports over past day has had to change pad every 2 hours and has been passing very large blood clots. She also endorses intermittent abd cramping. Earlier today  around 12pm  pt reports syncopal episode as well describing she passed out after BM while sitting a toilet and fell down on the floor. Prior to event states she felt light headed sensation and intermittent abd cramping.  Denies injury or pain related to fall. Pt stated she had LMP-11/2020 and started birth control pill in March 2021 for premenopause symptoms. She is still taking her OCP despite having the PE.  Denies fevers, chills, HA, chest pain, sob, n/v/d. PMD- Dr. Mary Chambers   GYN- Dr. Gisell Mari    In ED pt H/H 10.6/33.1 from 14.3/44.8 on 8/9. PT 31.4 and INR 2.75. ED consulted GYN given pt needs a/c and is actively bleeding with drop in H/H. Ordered for TVUS for further eval. Pt accepted to CDU to trend H/H and for further recommendations with GYN

## 2021-08-29 NOTE — ED ADULT NURSE NOTE - OBJECTIVE STATEMENT
Pt 40 y/o female, AxOx3, presents to ED from home complaining of worsening of vaginal bleeding x 2 days w/ lightheadedness and dizziness. Pt started on xarelto for PE after presenting to ED for SOB 3 weeks ago. Since starting on xarelto- pt has had vaginal bleeding/ spotting- worsening x 2 days w/ large clots. Last normal menstrual cycle reported to be November. Pt is well appearing, speaking full sentences without difficulty. Breathing spontaneous and unlabored with pulse ox >95% on room air. Upon assessment, abdomen soft and nontender, +strong peripheral pulses, moving all extremities without difficulty, lungs clear. Safety and comfort measures initiated- bed placed in lowest position and side rails raised. Pt oriented to call bell system.

## 2021-08-29 NOTE — ED ADULT NURSE REASSESSMENT NOTE - NS ED NURSE REASSESS COMMENT FT1
OB GYN at bedside for eval. Pt denies pain and is ambulatory without weakness or difficulty. No acute distress noted.

## 2021-08-29 NOTE — ED PROVIDER NOTE - CLINICAL SUMMARY MEDICAL DECISION MAKING FREE TEXT BOX
Attending MD Burgess : 41 F with recent PE on xarelto c/o worsening vaginal bleeding c/b syncopal episode this afternoon without evidecne of trauma c/f critical anemia from vaginal bleeding, new focus of bleeding in uterus (given patient has not had period since 11/2020). Patient currently does not feel syncopal or near syncopal, but does feel cramping. Will obtain US to determine any focus of bleeding, pregnancy test, Hgb to assess for degree for crit drop.

## 2021-08-29 NOTE — ED CDU PROVIDER INITIAL DAY NOTE - PHYSICAL EXAMINATION
Attending MD Burgess :   PHYSICAL EXAM:    GENERAL: NAD  HEENT:  Atraumatic  CHEST/LUNG: Chest rise equal bilaterally. CTAB.   HEART: Regular rate and rhythm. No murmurs or rubs.   ABDOMEN: Mild suprapubic TTP.   : performed by NP Rosette Hooker. Clots in vaginal vault obscuring full view of os, but no active bleeding into vaginal vault noted.    EXTREMITIES:  Extremities warm  PSYCH: A&Ox3  SKIN: No obvious rashes or lesions CONSTITUTIONAL: Patient is awake, alert and oriented x 3. Patient is well appearing and in no acute distress  HEAD: NCAT  NECK: supple, FROM  LUNGS: CTA B/L  HEART: RRR  ABDOMEN: Soft nd, mild suprapubic ttp, otherwise nttp,  no rebound or guarding  PELVIC: deferred see ED Provider note and GYN note   EXTREMITY: no edema or calf tenderness b/l, FROM upper and lower ext b/l  SKIN: with no rash or lesions  NEURO: No focal deficits

## 2021-08-29 NOTE — ED PROVIDER NOTE - OBJECTIVE STATEMENT
Recent diagnosed PE (8/9/2021) on Xarelto   Heavy vaginal bleeding for 3 weeks.   headache, dizziness, abd cramping, syncopal episode for 1-2minutes at 12md  LMP-11/2020  started birth control pill in March 2021  no n/v/d. no cp/sob. no fever, chills  PMD- Dr. Mary Chambers  GYN- Dr. Gisell Mari 40yo female pt with PMHx of Recent diagnosed PE (8/9/2021) on Xarelto presents to ED with heavy vaginal bleeding for 3 weeks and gradual worsening vaginal bleeding (changing pad every 2 hours) with intermittent abd cramping. She noticed headache, dizziness, intermittent abd cramping, and syncopal episode for 1-2minutes at 12md today. She passed out for 1-2minutes after BM around midday while sitting a toilet and fell down on the floor. Her family heard her fall and helped her out. Denies injury or pain. Pt stated she had LMP-11/2020 and started birth control pill in March 2021 for premenopause symptoms. She had episodes of SOB 3 weeks ago and diagnosed PE in ED. She's on both Xarelto and birth control pill now. Denies n/v/d. no cp/sob. no fever/chills.   PMD- Dr. Mary Chambers  GYN- Dr. Gisell Mari 42yo female pt with PMHx of Recent diagnosed PE (8/9/2021) on Xarelto presents to ED with heavy vaginal bleeding for 3 weeks and gradual worsening vaginal bleeding (changing pad every 2 hours) with intermittent abd cramping. She noticed headache, dizziness, intermittent abd cramping, and syncopal episode for 1-2minutes at 12pm today. She passed out for 1-2minutes after BM around midday while sitting a toilet and fell down on the floor. Her family heard her fall and helped her out. Denies injury or pain. No headache, neck pain, weakness or numbness. Pt stated she had LMP-11/2020 and started birth control pill in March 2021 for premenopause symptoms. Interestingly, she is still taking her OCP despite having the PE. She had episodes of SOB 3 weeks ago and diagnosed PE in ED.  Denies n/v/d. no cp/sob. no fever/chills.   PMD- Dr. Mary Chambers  GYN- Dr. Gisell Mari

## 2021-08-29 NOTE — ED CDU PROVIDER INITIAL DAY NOTE - MEDICAL DECISION MAKING DETAILS
Attending MD Burgess : 41 year old female with PMHx recently diagnosed PE (8/9/2021) on Xarelto presents to ED with heavy vaginal bleeding for 3 weeks which has been gradually worsening c/b syncopal episode today without overt trauma. Noted to have drop in H&H. Patient placed in CDU for OBGYN eval, H&H trending for 24 hours given almost 4 point crit drop over 20 days, and reassessment. Currently hemodynamically stable.

## 2021-08-29 NOTE — ED ADULT TRIAGE NOTE - CHIEF COMPLAINT QUOTE
lightheadedness/SOB x2 days  started on xarelto 3 wks ago, vaginal bleeding since that time worsened since yesterday

## 2021-08-29 NOTE — ED ADULT NURSE REASSESSMENT NOTE - NS ED NURSE REASSESS COMMENT FT1
1752: Pharmacy called for medication that is not available on the unit. Spoke with pharmacist, states the Xarelto will be sent to station [id number] as soon as it is available. pending medication at this time.  1806: pt transported to US.

## 2021-08-29 NOTE — CONSULT NOTE ADULT - ASSESSMENT
41y  LMP 2020 (and Aug 9th after starting Xarelto for PE presents with heavy vaginal bleeding for the past 3 weeks and lightheadedness/palpitations at home today. Upon arrival VS wnl, Hg 10.6 and physical exam notable for moderate blood in posterior fornix. Vaginal bleeding likely 2/2 starting Xarelto anticoagulation therapy    - f/u TVUS  - f/u repeat CBC  - Stop taking estrogen containing OCPs  - Pad counts (call GYN if FULLY soak >2 pads/hr)  - further recs pending TVUS results  - no acute GYN intervention at this time  - f/u outpatient OBGYN for alternative treatments of perimenopausal symptoms and reducing menstrual blood flow in the setting of Xarelto   - patient has f/u apt with Endocrine on Tuesday and Heme on Wednesday  - primary care per ED    d/w Dr. Kodi Jurado PGY2 41y  LMP 2020 (and Aug 9th after starting Xarelto for PE presents with heavy vaginal bleeding for the past 3 weeks and lightheadedness/palpitations at home today. Upon arrival VS wnl, Hg 10.6 and physical exam notable for moderate blood in posterior fornix. Vaginal bleeding likely 2/2 starting Xarelto anticoagulation therapy    - f/u TVUS  - f/u repeat CBC  - Stop taking estrogen containing OCPs  - Pad counts (call GYN if FULLY soak >2 pads/hr)  - further recs pending TVUS results  - Iron supplementation  - no acute GYN intervention at this time  - f/u outpatient OBGYN for alternative treatments of perimenopausal symptoms and reducing menstrual blood flow in the setting of Xarelto   - patient has f/u apt with Endocrine on Tuesday and Heme on Wednesday  - primary care per ED    d/w Dr. Kodi Jurado PGY2 41y  LMP 2020 (and Aug 9th after starting Xarelto for PE presents with heavy vaginal bleeding for the past 3 weeks and lightheadedness/palpitations at home today. Upon arrival VS wnl, Hg 10.6 and physical exam notable for moderate blood in posterior fornix. Vaginal bleeding likely 2/2 starting Xarelto anticoagulation therapy    - f/u TVUS.  - f/u repeat CBC  - Stop taking estrogen containing OCPs  - Pad counts (call GYN if FULLY soak >2 pads/hr)  - further recs pending TVUS results  - Iron supplementation  - no acute GYN intervention at this time  - f/u outpatient OBGYN for alternative treatments of perimenopausal symptoms and reducing menstrual blood flow in the setting of Xarelto   - patient has f/u apt with Endocrine on Tuesday and Heme on Wednesday  - primary care per ED    d/w Dr. Kodi Jurado PGY2

## 2021-08-29 NOTE — ED CDU PROVIDER DISPOSITION NOTE - ATTENDING CONTRIBUTION TO CARE
Attending note (Marcial):     40 y/o F with h/o dysfunctional uterine bleeding (on OCP) and recently diagnosed PE (8/9/2021, on Xarelto) presented to the ED yesterday with worsening vaginal bleeding over the course of the past 3 weeks, associated with intermittent abdominal cramping and passage of large clots; 12 pm yesterday, prior to presentation, had episode of syncope associated with bowel movement / training.  No fever/chills, non nausea/vomiting, no chest pain, no shortness of breath.    Private GYN- Dr. Gisell Mari    In ED pt H/H 10.6/33.1 from 14.3/44.8 on 8/9. PT 31.4 and INR 2.75. ED consulted GYN given pt needs to remain on AC for PE, but is still having vaginal bleeding with drop in H/H (Hgb 14.3->10.6). US obtained shows material (nonvascular) within uterus and presence of fibroids.  Afebrile, no gross evidence of infectious etiology.  OBGYN recommended serial CBC to monitor for acute drop in H/H, and to stop OCPs.  Patient kept in CDU for monitoring, repeat CBC.    This morning, remains hemodynamically stable, in NAD, and with serial CBC showing Hgb 10.6->10.1->10.3.  Case discussed with OBGYN team who recommend discontinuation of OCP but no further intervention at this time; patient is stable for further close interval follow up with her private OBGYN as well as endocrinology and hematology; with iron supplementation and strict return precautions advised.

## 2021-08-29 NOTE — ED PROVIDER NOTE - ATTENDING CONTRIBUTION TO CARE
I have personally seen and examined this patient. I have discussed the case with the ACP. I have reviewed all pertinent clinical information, including history, physical exam, plan and the ACP’s note and agree except as noted. See MDM

## 2021-08-30 VITALS
SYSTOLIC BLOOD PRESSURE: 104 MMHG | HEART RATE: 60 BPM | RESPIRATION RATE: 16 BRPM | DIASTOLIC BLOOD PRESSURE: 66 MMHG | TEMPERATURE: 98 F | OXYGEN SATURATION: 99 %

## 2021-08-30 LAB
APTT BLD: 34.4 SEC — SIGNIFICANT CHANGE UP (ref 27.5–35.5)
HCT VFR BLD CALC: 32.2 % — LOW (ref 34.5–45)
HGB BLD-MCNC: 10.3 G/DL — LOW (ref 11.5–15.5)
INR BLD: 2.43 RATIO — HIGH (ref 0.88–1.16)
MCHC RBC-ENTMCNC: 27.9 PG — SIGNIFICANT CHANGE UP (ref 27–34)
MCHC RBC-ENTMCNC: 32 GM/DL — SIGNIFICANT CHANGE UP (ref 32–36)
MCV RBC AUTO: 87.3 FL — SIGNIFICANT CHANGE UP (ref 80–100)
NRBC # BLD: 0 /100 WBCS — SIGNIFICANT CHANGE UP (ref 0–0)
PLATELET # BLD AUTO: 204 K/UL — SIGNIFICANT CHANGE UP (ref 150–400)
PROTHROM AB SERPL-ACNC: 28 SEC — HIGH (ref 10.6–13.6)
RBC # BLD: 3.69 M/UL — LOW (ref 3.8–5.2)
RBC # FLD: 12.2 % — SIGNIFICANT CHANGE UP (ref 10.3–14.5)
WBC # BLD: 5.19 K/UL — SIGNIFICANT CHANGE UP (ref 3.8–10.5)
WBC # FLD AUTO: 5.19 K/UL — SIGNIFICANT CHANGE UP (ref 3.8–10.5)

## 2021-08-30 PROCEDURE — 80053 COMPREHEN METABOLIC PANEL: CPT

## 2021-08-30 PROCEDURE — 85730 THROMBOPLASTIN TIME PARTIAL: CPT

## 2021-08-30 PROCEDURE — 85025 COMPLETE CBC W/AUTO DIFF WBC: CPT

## 2021-08-30 PROCEDURE — 76830 TRANSVAGINAL US NON-OB: CPT

## 2021-08-30 PROCEDURE — 99217: CPT

## 2021-08-30 PROCEDURE — 84702 CHORIONIC GONADOTROPIN TEST: CPT

## 2021-08-30 PROCEDURE — 85610 PROTHROMBIN TIME: CPT

## 2021-08-30 PROCEDURE — 99284 EMERGENCY DEPT VISIT MOD MDM: CPT | Mod: 25

## 2021-08-30 PROCEDURE — G0378: CPT

## 2021-08-30 PROCEDURE — U0003: CPT

## 2021-08-30 PROCEDURE — 86901 BLOOD TYPING SEROLOGIC RH(D): CPT

## 2021-08-30 PROCEDURE — 93005 ELECTROCARDIOGRAM TRACING: CPT

## 2021-08-30 PROCEDURE — 96361 HYDRATE IV INFUSION ADD-ON: CPT

## 2021-08-30 PROCEDURE — 86850 RBC ANTIBODY SCREEN: CPT

## 2021-08-30 PROCEDURE — 85027 COMPLETE CBC AUTOMATED: CPT

## 2021-08-30 PROCEDURE — 96360 HYDRATION IV INFUSION INIT: CPT

## 2021-08-30 PROCEDURE — 86900 BLOOD TYPING SEROLOGIC ABO: CPT

## 2021-08-30 PROCEDURE — 93975 VASCULAR STUDY: CPT

## 2021-08-30 RX ORDER — FERROUS SULFATE 325(65) MG
325 TABLET ORAL ONCE
Refills: 0 | Status: COMPLETED | OUTPATIENT
Start: 2021-08-30 | End: 2021-08-30

## 2021-08-30 RX ORDER — RIVAROXABAN 15 MG-20MG
1 KIT ORAL
Qty: 7 | Refills: 0
Start: 2021-08-30

## 2021-08-30 RX ORDER — POLYETHYLENE GLYCOL 3350 17 G/17G
17 POWDER, FOR SOLUTION ORAL ONCE
Refills: 0 | Status: COMPLETED | OUTPATIENT
Start: 2021-08-30 | End: 2021-08-30

## 2021-08-30 RX ADMIN — Medication 325 MILLIGRAM(S): at 09:10

## 2021-08-30 RX ADMIN — POLYETHYLENE GLYCOL 3350 17 GRAM(S): 17 POWDER, FOR SOLUTION ORAL at 09:10

## 2021-08-30 RX ADMIN — RIVAROXABAN 15 MILLIGRAM(S): KIT at 09:58

## 2021-08-30 NOTE — ED ADULT NURSE REASSESSMENT NOTE - NS ED NURSE REASSESS COMMENT FT1
09.30 Pt is evaluated by CDU MD Marcial Graves. pt is feeling better.  Pt is discharged . Ml out  JAMARI Augustin explained the follow up care & gave the discharge summary  . Pt has stable vitals steady gait A&OX 4 at the time of  discharge

## 2021-08-30 NOTE — ED CDU PROVIDER SUBSEQUENT DAY NOTE - HISTORY
CDU NOTE PA VAN: Pt resting in stretcher in NAD. VSS. No interval change from prior exam. Pt reports vaginal bleeding has diminished. Will f/u repeat labs and OB/GYN final recs.

## 2021-08-30 NOTE — ED CDU PROVIDER SUBSEQUENT DAY NOTE - PROGRESS NOTE DETAILS
Patient seen and evaluated at bedside, resting comfortably, NAD. Reports unchanged lightheadedness. Reports continuous vaginal bleeding, + clots, saturating pads approx every 3 hours. No CP, no SOB. VSS. Repeat CBC stable. Per signout, pending final GYN recs. Spoke with OBGYN, no acute intervention as pt with stable labs and vitals, but recommending to d/c OCP use. Per GYN, pt with both endo and heme f/u this week, and has a private GYN to f/u with. Pt stable for d/c. D/w Dr. Ceja. Spoke with OBGYN, no acute intervention as pt with stable labs and vitals, but recommending to d/c OCP use. Patient has both endo and heme f/u this week, and has a private GYN to f/u with. Pt reports she will f/u for iron infusions which she has done in the past, does not prefer oral iron supplements due to constipation. Pt stable for d/c. D/w Dr. Ceja.

## 2021-08-30 NOTE — ED CDU PROVIDER SUBSEQUENT DAY NOTE - NSICDXNOPASTMEDICALHX_GEN_ALL_ED
unspecified site     Other general symptoms(780.99)     Other iatrogenic hypothyroidism     Other nonspecific abnormal finding of lung field     Other psoriasis     Pure hypercholesterolemia     Regional enteritis of unspecified site     Senile osteoporosis     Sprain of ankle, unspecified site     Unspecified vitamin D deficiency       Past Surgical History:   Procedure Laterality Date    COLONOSCOPY      DILATION AND CURETTAGE OF UTERUS      UPPER GASTROINTESTINAL ENDOSCOPY N/A 2019    EGD FOREIGN BODY REMOVAL performed by Lorenzo Cain MD at [address] OR       Family History   Problem Relation Age of Onset    Coronary Art Dis Father        Social History     Tobacco Use    Smoking status: Former Smoker     Packs/day: 0.75     Years: 2.00     Pack years: 1.50     Types: Cigarettes     Start date: 10/25/1958     Quit date: 10/25/2018     Years since quittin.6    Smokeless tobacco: Never Used   Substance Use Topics    Alcohol use: No     Alcohol/week: 0.0 standard drinks      Current Outpatient Medications   Medication Sig Dispense Refill    colchicine (COLCRYS) 0.6 MG tablet Take 2 tabs immediately, then 1 tablet 1 hour later. Then one tablet a day for 2 more days.  10 tablet 11    DULoxetine (CYMBALTA) 20 MG extended release capsule Take 1 capsule by mouth daily 30 capsule 3    carvedilol (COREG) 12.5 MG tablet TAKE ONE-HALF TABLET BY  MOUTH TWO TIMES A DAY 90 tablet 3    atorvastatin (LIPITOR) 40 MG tablet TAKE 1 TABLET BY MOUTH  DAILY 90 tablet 3    allopurinol (ZYLOPRIM) 300 MG tablet Take 1 tablet by mouth daily 90 tablet 5    levothyroxine (SYNTHROID) 125 MCG tablet TAKE 1 TABLET BY MOUTH ONCE DAILY 90 tablet 3    methylPREDNISolone (MEDROL DOSEPACK) 4 MG tablet       nitrofurantoin, macrocrystal-monohydrate, (MACROBID) 100 MG capsule TAKE 1 CAPSULE BY MOUTH TWICE DAILY      Clobetasol Propionate (CLOBEX) 0.05 % SHAM Apply 1 Act topically once a week 1 Bottle 0    folic left knee  94391 - LA DRAIN/INJECT LARGE JOINT/BURSA    Culture, Body Fluid    Uric acid synovial fluid    Synovial fluid, crystal             Plan:      Return in about 1 month (around 7/7/2021). Orders Placed This Encounter   Procedures    Culture, Body Fluid     Standing Status:   Future     Standing Expiration Date:   6/7/2022    Uric acid synovial fluid     Order Specific Question:   Specify Specimen Type     Answer:   Fluid    Synovial fluid, crystal     Left knee synovial fluid     Order Specific Question:   Prior Abnormal Smear     Answer:   (14) None given    73073 - LA DRAIN/INJECT LARGE JOINT/BURSA     Orders Placed This Encounter   Medications    methylPREDNISolone acetate (DEPO-MEDROL) injection 80 mg    colchicine (COLCRYS) 0.6 MG tablet     Sig: Take 2 tabs immediately, then 1 tablet 1 hour later. Then one tablet a day for 2 more days. Dispense:  10 tablet     Refill:  11    hx of gout  Left knee pain and swelling  No redness  Mild to mod pain  clinicaly effuion is large without in fection  With aseptic tech  No touch  Sup medial approach  15 ml of serosang fluid drained  Sent for above labs  Will see in a moth  Will rev labs as they come     Patient given educational materials - see patient instructions. Discussed use, benefit, and side effects of prescribed medications. All patientquestions answered. Pt voiced understanding. Reviewed health maintenance. Instructedto continue current medications, diet and exercise. Patient agreed with treatmentplan. Follow up as directed. Electronically signed by Colt Whitman MD on 6/7/2021 at 3:30 PM tests since your last visit? No  Have you been seen in the emergency room and/or had an admission to a hospital since we last saw you? No  Have you had your routine dental cleaning in the past 6 months? yes -     Have you activated your [employer] account? If not, what are your barriers?  Yes     Patient Care Team:  Colt Whitman MD as PCP - <-- Click to add NO pertinent Past Medical History

## 2021-08-30 NOTE — ED CDU PROVIDER SUBSEQUENT DAY NOTE - PHYSICAL EXAMINATION
CONSTITUTIONAL: Patient is awake, alert and oriented x 3. Patient is well appearing and in no acute distress  HEAD: NCAT  NECK: supple, FROM  LUNGS: CTA B/L  HEART: RRR  ABDOMEN: Soft nd, mild suprapubic ttp, otherwise nttp,  no rebound or guarding  PELVIC: deferred see ED Provider note and GYN note   EXTREMITY: no edema or calf tenderness b/l, FROM upper and lower ext b/l  SKIN: with no rash or lesions  NEURO: No focal deficits

## 2021-08-30 NOTE — ED ADULT NURSE REASSESSMENT NOTE - NS ED NURSE REASSESS COMMENT FT1
07.00 Am Received the Pt from  GEORGETTE Navas . Pt is Observed for vaginal bleeding on Xarelta . Received the Pt A&OX 4 obeys commands Faith N/V/D fever chills cp SOB   Comfort care & safety measures continued  IV site looks clean & dry no signs of infiltration noted pt denies  pain IV site .  Pt is advised to call for help  call bell with in the reach pt verbalized the understanding . Pt states  she is still bleeding  but not heavy as before  pending CDU  MD alvarez . GCS 15/15 A&OX 4 PERRLA  size 3 Strong upper & lower extremities steady gait   No facial droop  No Hand Leg drop denies numbness tingling Continue to monitor

## 2021-08-31 ENCOUNTER — OUTPATIENT (OUTPATIENT)
Dept: OUTPATIENT SERVICES | Facility: HOSPITAL | Age: 41
LOS: 1 days | Discharge: HOME | End: 2021-08-31

## 2021-08-31 DIAGNOSIS — I26.99 OTHER PULMONARY EMBOLISM WITHOUT ACUTE COR PULMONALE: ICD-10-CM

## 2021-08-31 NOTE — ED POST DISCHARGE NOTE - DETAILS
Called to inform patient of above. Will advise that she call her hematologist. - Dianna Augustin PA-C

## 2021-08-31 NOTE — ED POST DISCHARGE NOTE - RESULT SUMMARY
Called by ED admin office, pt called reporting that Xarelto Rx needed prior auth. I personally discharged pt from CDU and Rx for 7 days of xarelto only sent because patient reported she was unable see her hematologist before running out of her medication. Unable to provide prior auth from ED.

## 2021-09-01 ENCOUNTER — APPOINTMENT (OUTPATIENT)
Dept: VASCULAR SURGERY | Facility: CLINIC | Age: 41
End: 2021-09-01
Payer: COMMERCIAL

## 2021-09-01 ENCOUNTER — NON-APPOINTMENT (OUTPATIENT)
Age: 41
End: 2021-09-01

## 2021-09-01 VITALS
WEIGHT: 138 LBS | HEART RATE: 67 BPM | TEMPERATURE: 98.2 F | BODY MASS INDEX: 25.4 KG/M2 | SYSTOLIC BLOOD PRESSURE: 102 MMHG | HEIGHT: 62 IN | DIASTOLIC BLOOD PRESSURE: 69 MMHG

## 2021-09-01 DIAGNOSIS — N93.9 ABNORMAL UTERINE AND VAGINAL BLEEDING, UNSPECIFIED: ICD-10-CM

## 2021-09-01 PROCEDURE — 99204 OFFICE O/P NEW MOD 45 MIN: CPT

## 2021-09-01 PROCEDURE — 93976 VASCULAR STUDY: CPT

## 2021-09-01 PROCEDURE — 93970 EXTREMITY STUDY: CPT

## 2021-09-01 NOTE — ASSESSMENT
[Arterial/Venous Disease] : arterial/venous disease [Medication Management] : medication management [FreeTextEntry1] : Impression venous insuff not yet sono evident, s/o hypercoag state   w recent ocp use and  new onset of abnormal  gyn bleeding \par \par Plan Med Conservative management  d/w pt comp stockings rx if le sx develop\par advised pt  of the following\par i still remain s/o le dvt source for PE \par will hold off on any median arcuate llig syndrome w/u since pt is currently asymptomatic  and  there are other  active  medical conditions which need to be addressed \par continue xarelto  for PE till f/u ov or telehealth in 3 mo and at that time will d/w pt f/u w pulmonary and will require CTPA , based on this will decide on anticoag vs asa rx\par pt will also need a hyper coag eval by heme at the end of the  anticoag  rx for pe\par f/u w gyn for eval of  abnormal  gyn bleeding\par f/u w internist to monitor hh and may need  supplementary iron intake\par d/w pt is ongoing gyn bleeding continues w/o a dz and the continue to have  dizzy  spells she may need to d/c anticoag rx earliner than clinically indicated\par avoid  foods high in vit k  black, avocadoes, cashews, spinach  till hypercoag w/u is completed \par telehealth  eval in 1 mo \par \par

## 2021-09-01 NOTE — DATA REVIEWED
[FreeTextEntry1] : Outside facility report reviewed [facility] 8/9/2021 CTPA sig for RLL PE and incidental s/o  celiac art stenosis  from median arcuate lig  w distal dilatation 1 cm \par \par 9/1/2021 Venous Doppler Ashok LE  no acute dvt svt \par                            RLE no sono evidence of reflux\par                            LLE  no sono evidence of reflux \par                                      \par \par 9/1/2021 Abdominal Celiac  Duplex   sig for greater than 75 % stenosis c/w median arcuate lig syndrome\par \par

## 2021-09-01 NOTE — HISTORY OF PRESENT ILLNESS
[FreeTextEntry1] : past several weeks  onset of dizzy spells duration 2-5 min about 2/day\par pt noticed heart racing  w these events\par pt f/u at [facility] aug 9 2021  w dizziness sob and chest pain\par pt states  vaginal bleeding ans spotting  described as heavy  since start of xarelto \par ctpa  sig for PE RLL PE and celiac artery stenosis\par pt started on xarelto 15 bid to be transitioned to  20 daily \par pt states that she is perimenopausal last menstrual period Nov 22 2020 \par last  gyn check up May 2021\par pt presented  [facility] ED Aug 29 2021 passed out at home from additional vaginal bleeding\par eval w us\par pt was not transfused \par pt in OCP for  perimenopausal sx  since march 2021 which were d/c sat aug 28 2021\par \par pt denies any previous le c/o\par pt denies previous le thrombophilic events or trauma \par pt is not a smoker\par pt is a  \par \par pt also underwent a holter monitor for 5 days 1 mo ago  before the aug 9 2021 event in late july 2021\par as per pt  w/u was neg \par \par pt states some recent  planned wt loss \par

## 2021-09-02 ENCOUNTER — RESULT REVIEW (OUTPATIENT)
Age: 41
End: 2021-09-02

## 2021-09-02 ENCOUNTER — APPOINTMENT (OUTPATIENT)
Dept: HEMATOLOGY ONCOLOGY | Facility: CLINIC | Age: 41
End: 2021-09-02
Payer: COMMERCIAL

## 2021-09-02 VITALS
TEMPERATURE: 97.6 F | BODY MASS INDEX: 25.24 KG/M2 | WEIGHT: 138.89 LBS | HEART RATE: 75 BPM | RESPIRATION RATE: 15 BRPM | SYSTOLIC BLOOD PRESSURE: 106 MMHG | OXYGEN SATURATION: 99 % | DIASTOLIC BLOOD PRESSURE: 61 MMHG | HEIGHT: 62.2 IN

## 2021-09-02 DIAGNOSIS — Z82.62 FAMILY HISTORY OF OSTEOPOROSIS: ICD-10-CM

## 2021-09-02 DIAGNOSIS — Z78.9 OTHER SPECIFIED HEALTH STATUS: ICD-10-CM

## 2021-09-02 LAB
BASOPHILS # BLD AUTO: 0.02 K/UL — SIGNIFICANT CHANGE UP (ref 0–0.2)
BASOPHILS NFR BLD AUTO: 0.3 % — SIGNIFICANT CHANGE UP (ref 0–2)
EOSINOPHIL # BLD AUTO: 0.11 K/UL — SIGNIFICANT CHANGE UP (ref 0–0.5)
EOSINOPHIL NFR BLD AUTO: 1.8 % — SIGNIFICANT CHANGE UP (ref 0–6)
HCT VFR BLD CALC: 31.1 % — LOW (ref 34.5–45)
HGB BLD-MCNC: 10.3 G/DL — LOW (ref 11.5–15.5)
IMM GRANULOCYTES NFR BLD AUTO: 1.1 % — SIGNIFICANT CHANGE UP (ref 0–1.5)
LYMPHOCYTES # BLD AUTO: 2.28 K/UL — SIGNIFICANT CHANGE UP (ref 1–3.3)
LYMPHOCYTES # BLD AUTO: 36.6 % — SIGNIFICANT CHANGE UP (ref 13–44)
MCHC RBC-ENTMCNC: 28.6 PG — SIGNIFICANT CHANGE UP (ref 27–34)
MCHC RBC-ENTMCNC: 33.1 G/DL — SIGNIFICANT CHANGE UP (ref 32–36)
MCV RBC AUTO: 86.4 FL — SIGNIFICANT CHANGE UP (ref 80–100)
MONOCYTES # BLD AUTO: 0.37 K/UL — SIGNIFICANT CHANGE UP (ref 0–0.9)
MONOCYTES NFR BLD AUTO: 5.9 % — SIGNIFICANT CHANGE UP (ref 2–14)
NEUTROPHILS # BLD AUTO: 3.38 K/UL — SIGNIFICANT CHANGE UP (ref 1.8–7.4)
NEUTROPHILS NFR BLD AUTO: 54.3 % — SIGNIFICANT CHANGE UP (ref 43–77)
NRBC # BLD: 0 /100 WBCS — SIGNIFICANT CHANGE UP (ref 0–0)
PLATELET # BLD AUTO: 205 K/UL — SIGNIFICANT CHANGE UP (ref 150–400)
RBC # BLD: 3.6 M/UL — LOW (ref 3.8–5.2)
RBC # FLD: 12.4 % — SIGNIFICANT CHANGE UP (ref 10.3–14.5)
WBC # BLD: 6.23 K/UL — SIGNIFICANT CHANGE UP (ref 3.8–10.5)
WBC # FLD AUTO: 6.23 K/UL — SIGNIFICANT CHANGE UP (ref 3.8–10.5)

## 2021-09-02 PROCEDURE — 99205 OFFICE O/P NEW HI 60 MIN: CPT

## 2021-09-02 RX ORDER — RIVAROXABAN 20 MG/1
20 TABLET, FILM COATED ORAL
Qty: 30 | Refills: 2 | Status: ACTIVE | COMMUNITY
Start: 2021-09-02 | End: 1900-01-01

## 2021-09-13 LAB
ALBUMIN SERPL ELPH-MCNC: 4.4 G/DL
ALP BLD-CCNC: 51 U/L
ALT SERPL-CCNC: 7 U/L
ANION GAP SERPL CALC-SCNC: 15 MMOL/L
AST SERPL-CCNC: 17 U/L
BILIRUB SERPL-MCNC: 0.6 MG/DL
BUN SERPL-MCNC: 7 MG/DL
CALCIUM SERPL-MCNC: 9.6 MG/DL
CHLORIDE SERPL-SCNC: 102 MMOL/L
CO2 SERPL-SCNC: 22 MMOL/L
CREAT SERPL-MCNC: 0.7 MG/DL
DNA PLOIDY SPEC FC-IMP: NORMAL
FERRITIN SERPL-MCNC: 8 NG/ML
GLUCOSE SERPL-MCNC: 71 MG/DL
IRON SATN MFR SERPL: 6 %
IRON SERPL-MCNC: 26 UG/DL
POTASSIUM SERPL-SCNC: 4.5 MMOL/L
PROT SERPL-MCNC: 7.4 G/DL
PTR INTERP: NORMAL
SODIUM SERPL-SCNC: 139 MMOL/L
TIBC SERPL-MCNC: 458 UG/DL
UIBC SERPL-MCNC: 432 UG/DL

## 2021-09-19 PROBLEM — Z82.62 FAMILY HISTORY OF OSTEOPOROSIS: Status: ACTIVE | Noted: 2021-09-19

## 2021-09-19 PROBLEM — Z78.9 RARELY CONSUMES ALCOHOL: Status: ACTIVE | Noted: 2021-09-19

## 2021-09-19 RX ORDER — RIVAROXABAN 20 MG/1
20 TABLET, FILM COATED ORAL
Qty: 30 | Refills: 2 | Status: ACTIVE | COMMUNITY

## 2021-09-19 RX ORDER — ACETAMINOPHEN 325 MG/1
325 TABLET, FILM COATED ORAL
Refills: 0 | Status: ACTIVE | COMMUNITY

## 2021-09-20 ENCOUNTER — APPOINTMENT (OUTPATIENT)
Dept: INFUSION THERAPY | Facility: HOSPITAL | Age: 41
End: 2021-09-20

## 2021-09-20 DIAGNOSIS — R11.2 NAUSEA WITH VOMITING, UNSPECIFIED: ICD-10-CM

## 2021-09-20 NOTE — REVIEW OF SYSTEMS
[Patient Intake Form Reviewed] : Patient intake form was reviewed [Fatigue] : fatigue [Chest Pain] : chest pain [Palpitations] : palpitations [SOB on Exertion] : shortness of breath during exertion [Dizziness] : dizziness [Dysmenorrhea/Abn Vaginal Bleeding] : dysmenorrhea/abnormal vaginal bleeding [Negative] : Allergic/Immunologic [de-identified] : occasional headaches [de-identified] : feeling cold

## 2021-09-20 NOTE — ASSESSMENT
[FreeTextEntry1] : 41 year old woman with no significant past medical history here for evaluation of hypercoagulability and to determine length of treatment after pulmonary embolism. Patient also history of heavy menstrual bleeding. \par \par Plan:\par Check CMP, iron, TIBC, Ferritin. Will check for Factor V Leiden and Prothrombin gene mutation. \par IV Venofer risks and benefits discussed.  Consent obtained. \par Recommend 3-6 months of anti-coagulation with Xarelto 20mg po daily. \par Will recommend Aspirin ppx after Xarelto is complete. \par Case and management discussed with Dr. Chata Mcmahon.

## 2021-09-20 NOTE — HISTORY OF PRESENT ILLNESS
[de-identified] : Initial Consultation on 2021\par Referred by: [facility]\par \par CC:\par PE, hypercoagulable work-up, determine length of treatment\par \par HPI:\par 41 year old woman with no significant past medical history here for evaluation of hypercoagulability and to determine length of treatment after recent diagnosis of pulmonary embolism.  Patient was in her usual state of health until the beginning of 2021 when she developed chest pain, SOB, tachycardia and dizziness.  Patient was on oral contraceptives since 3/2021 for perimenopausal symptoms.  She denied any prior long car/airplane travel, trauma or illness.  She presented to [facility] 2021 and CTA 2021 showed RLL segmental and subsegmental PEs, flattening and narrowing of celiac artery at ostium.  Patient was discharged to home on Xarelto 15mg po BID.  She returned to [facility] ED on 2021 with heavy menstrual bleeding X 3 weeks, lightheadedness, palpitations and syncopal episode.  Her hemoglobin was found to be 10.6.  Prior hemoglobin was 14.3 on 2021.  She was still taking oral contraceptives which was subsequently discontinued.  She saw vascular on 2021 and was advised no further intervention at this time.  She had a Doppler ultrasound yesterday that was negative for DVT.  \par \par She c/o fatigue. She reports occasional chest pain, SOB upon exertion, palpitations, dizziness/lightheadedness.  She feels better overall.  She is still having menstrual bleeding with clots.  She has follow-up with gynecology on 2021.  Patient denies any fever/chills, recent infections, SOB at rest, abdominal pain, n/v/d, bloody/black stools, night sweats, swollen glands or any unexplained bleeding/bruising. She has remote history of taking oral iron in the past; she was unable to tolerate secondary to severe constipation. 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 \par LMP 2020.  Denies vaginal bleeding from 2020-2021 was on oral contraceptives. \par Heavy bleeding since 8/10/2021 since start of Xarelto. Passing lots of clots and needs to change maxi pad every 2 hours. \par History of heavy menses in her 20's and 30's.  She received IV iron 2 times per year X 4 years in her 30's. \par \par Hospitalizations:\par As per HPI\par For surgeries and childbirth\par \par Medications:\par See List.  Medications reconciled\par Xarelto 20mg po daily\par Probiotics daily\par Tylenol PRN\par \par Allergies:\par Reglan, causes itching \par \par Social History:\par Single, never been . 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 \par Rare alcohol use. \par Denies smoking. \par Born in [place] \par Typically runs 3 miles a few times per week\par \par Family History:\par Mother alive with osteoporosis. \par Father alive and well. \par 3 sisters, all alive and well\par Daughter, alive and well. \par Denies family history of DVT, PE, CVA at early age or miscarriage late in pregnancy. \par \par Healthcare Maintenance:\par Primary care doctor- Dr. Mary Chambers- last seen 2021\par Last colonoscopy/ endoscopy- 2021-2021- both normal\par Last mammogram 2021 normal\par Last gynecology 2021\par Denies COVID vaccine \par History of COVID infection 2020- mild symptoms- aches, fever/chills, not hospitalized\par Vascular surgery visit 2021 [de-identified] : Initial Visit

## 2021-09-20 NOTE — PHYSICAL EXAM
[Fully active, able to carry on all pre-disease performance without restriction] : Status 0 - Fully active, able to carry on all pre-disease performance without restriction [Normal] : bilateral breasts without nipple retraction, skin dimpling or palpable masses; the bilateral axillae are without adenopathy [de-identified] : No cervical, axillary or inguinal LAD noted

## 2021-09-21 ENCOUNTER — NON-APPOINTMENT (OUTPATIENT)
Age: 41
End: 2021-09-21

## 2021-09-29 ENCOUNTER — OUTPATIENT (OUTPATIENT)
Dept: OUTPATIENT SERVICES | Facility: HOSPITAL | Age: 41
LOS: 1 days | Discharge: HOME | End: 2021-09-29

## 2021-09-29 DIAGNOSIS — I26.90 SEPTIC PULMONARY EMBOLISM WITHOUT ACUTE COR PULMONALE: ICD-10-CM

## 2021-10-02 ENCOUNTER — APPOINTMENT (OUTPATIENT)
Dept: INFUSION THERAPY | Facility: HOSPITAL | Age: 41
End: 2021-10-02

## 2021-10-04 DIAGNOSIS — D50.9 IRON DEFICIENCY ANEMIA, UNSPECIFIED: ICD-10-CM

## 2021-10-04 DIAGNOSIS — R11.2 NAUSEA WITH VOMITING, UNSPECIFIED: ICD-10-CM

## 2021-10-05 ENCOUNTER — NON-APPOINTMENT (OUTPATIENT)
Age: 41
End: 2021-10-05

## 2021-10-16 ENCOUNTER — APPOINTMENT (OUTPATIENT)
Dept: INFUSION THERAPY | Facility: HOSPITAL | Age: 41
End: 2021-10-16

## 2021-10-23 ENCOUNTER — APPOINTMENT (OUTPATIENT)
Dept: INFUSION THERAPY | Facility: HOSPITAL | Age: 41
End: 2021-10-23

## 2021-10-23 ENCOUNTER — EMERGENCY (EMERGENCY)
Facility: HOSPITAL | Age: 41
LOS: 1 days | Discharge: HOME | End: 2021-10-23
Attending: EMERGENCY MEDICINE | Admitting: EMERGENCY MEDICINE
Payer: COMMERCIAL

## 2021-10-23 VITALS
DIASTOLIC BLOOD PRESSURE: 68 MMHG | RESPIRATION RATE: 16 BRPM | HEART RATE: 75 BPM | SYSTOLIC BLOOD PRESSURE: 105 MMHG | OXYGEN SATURATION: 99 %

## 2021-10-23 VITALS
TEMPERATURE: 98 F | HEART RATE: 78 BPM | RESPIRATION RATE: 16 BRPM | DIASTOLIC BLOOD PRESSURE: 61 MMHG | SYSTOLIC BLOOD PRESSURE: 101 MMHG | HEIGHT: 62 IN | OXYGEN SATURATION: 99 %

## 2021-10-23 LAB
ALBUMIN SERPL ELPH-MCNC: 4.5 G/DL — SIGNIFICANT CHANGE UP (ref 3.3–5)
ALP SERPL-CCNC: 62 U/L — SIGNIFICANT CHANGE UP (ref 40–120)
ALT FLD-CCNC: 12 U/L — SIGNIFICANT CHANGE UP (ref 4–33)
ANION GAP SERPL CALC-SCNC: 11 MMOL/L — SIGNIFICANT CHANGE UP (ref 7–14)
APTT BLD: 39.2 SEC — HIGH (ref 27–36.3)
AST SERPL-CCNC: 20 U/L — SIGNIFICANT CHANGE UP (ref 4–32)
B PERT DNA SPEC QL NAA+PROBE: SIGNIFICANT CHANGE UP
B PERT+PARAPERT DNA PNL SPEC NAA+PROBE: SIGNIFICANT CHANGE UP
BASOPHILS # BLD AUTO: 0.03 K/UL — SIGNIFICANT CHANGE UP (ref 0–0.2)
BASOPHILS NFR BLD AUTO: 0.5 % — SIGNIFICANT CHANGE UP (ref 0–2)
BILIRUB SERPL-MCNC: 0.2 MG/DL — SIGNIFICANT CHANGE UP (ref 0.2–1.2)
BORDETELLA PARAPERTUSSIS (RAPRVP): SIGNIFICANT CHANGE UP
BUN SERPL-MCNC: 12 MG/DL — SIGNIFICANT CHANGE UP (ref 7–23)
C PNEUM DNA SPEC QL NAA+PROBE: SIGNIFICANT CHANGE UP
CALCIUM SERPL-MCNC: 9.2 MG/DL — SIGNIFICANT CHANGE UP (ref 8.4–10.5)
CHLORIDE SERPL-SCNC: 105 MMOL/L — SIGNIFICANT CHANGE UP (ref 98–107)
CO2 SERPL-SCNC: 26 MMOL/L — SIGNIFICANT CHANGE UP (ref 22–31)
CREAT SERPL-MCNC: 0.67 MG/DL — SIGNIFICANT CHANGE UP (ref 0.5–1.3)
EOSINOPHIL # BLD AUTO: 0.16 K/UL — SIGNIFICANT CHANGE UP (ref 0–0.5)
EOSINOPHIL NFR BLD AUTO: 2.6 % — SIGNIFICANT CHANGE UP (ref 0–6)
FLUAV SUBTYP SPEC NAA+PROBE: SIGNIFICANT CHANGE UP
FLUBV RNA SPEC QL NAA+PROBE: SIGNIFICANT CHANGE UP
GLUCOSE SERPL-MCNC: 83 MG/DL — SIGNIFICANT CHANGE UP (ref 70–99)
HADV DNA SPEC QL NAA+PROBE: SIGNIFICANT CHANGE UP
HCG SERPL-ACNC: <5 MIU/ML — SIGNIFICANT CHANGE UP
HCOV 229E RNA SPEC QL NAA+PROBE: SIGNIFICANT CHANGE UP
HCOV HKU1 RNA SPEC QL NAA+PROBE: SIGNIFICANT CHANGE UP
HCOV NL63 RNA SPEC QL NAA+PROBE: SIGNIFICANT CHANGE UP
HCOV OC43 RNA SPEC QL NAA+PROBE: SIGNIFICANT CHANGE UP
HCT VFR BLD CALC: 46.8 % — HIGH (ref 34.5–45)
HGB BLD-MCNC: 14.1 G/DL — SIGNIFICANT CHANGE UP (ref 11.5–15.5)
HMPV RNA SPEC QL NAA+PROBE: SIGNIFICANT CHANGE UP
HPIV1 RNA SPEC QL NAA+PROBE: SIGNIFICANT CHANGE UP
HPIV2 RNA SPEC QL NAA+PROBE: SIGNIFICANT CHANGE UP
HPIV3 RNA SPEC QL NAA+PROBE: SIGNIFICANT CHANGE UP
HPIV4 RNA SPEC QL NAA+PROBE: SIGNIFICANT CHANGE UP
IANC: 3.15 K/UL — SIGNIFICANT CHANGE UP (ref 1.5–8.5)
IMM GRANULOCYTES NFR BLD AUTO: 0.3 % — SIGNIFICANT CHANGE UP (ref 0–1.5)
INR BLD: 1.2 RATIO — HIGH (ref 0.88–1.16)
LYMPHOCYTES # BLD AUTO: 2.38 K/UL — SIGNIFICANT CHANGE UP (ref 1–3.3)
LYMPHOCYTES # BLD AUTO: 38.8 % — SIGNIFICANT CHANGE UP (ref 13–44)
M PNEUMO DNA SPEC QL NAA+PROBE: SIGNIFICANT CHANGE UP
MCHC RBC-ENTMCNC: 25.8 PG — LOW (ref 27–34)
MCHC RBC-ENTMCNC: 30.1 GM/DL — LOW (ref 32–36)
MCV RBC AUTO: 85.6 FL — SIGNIFICANT CHANGE UP (ref 80–100)
MONOCYTES # BLD AUTO: 0.39 K/UL — SIGNIFICANT CHANGE UP (ref 0–0.9)
MONOCYTES NFR BLD AUTO: 6.4 % — SIGNIFICANT CHANGE UP (ref 2–14)
NEUTROPHILS # BLD AUTO: 3.15 K/UL — SIGNIFICANT CHANGE UP (ref 1.8–7.4)
NEUTROPHILS NFR BLD AUTO: 51.4 % — SIGNIFICANT CHANGE UP (ref 43–77)
NRBC # BLD: 0 /100 WBCS — SIGNIFICANT CHANGE UP
NRBC # FLD: 0 K/UL — SIGNIFICANT CHANGE UP
NT-PROBNP SERPL-SCNC: 9 PG/ML — SIGNIFICANT CHANGE UP
PLATELET # BLD AUTO: 288 K/UL — SIGNIFICANT CHANGE UP (ref 150–400)
POTASSIUM SERPL-MCNC: 4.4 MMOL/L — SIGNIFICANT CHANGE UP (ref 3.5–5.3)
POTASSIUM SERPL-SCNC: 4.4 MMOL/L — SIGNIFICANT CHANGE UP (ref 3.5–5.3)
PROT SERPL-MCNC: 7.8 G/DL — SIGNIFICANT CHANGE UP (ref 6–8.3)
PROTHROM AB SERPL-ACNC: 13.6 SEC — SIGNIFICANT CHANGE UP (ref 10.6–13.6)
RAPID RVP RESULT: SIGNIFICANT CHANGE UP
RBC # BLD: 5.47 M/UL — HIGH (ref 3.8–5.2)
RBC # FLD: 15.9 % — HIGH (ref 10.3–14.5)
RSV RNA SPEC QL NAA+PROBE: SIGNIFICANT CHANGE UP
RV+EV RNA SPEC QL NAA+PROBE: SIGNIFICANT CHANGE UP
SARS-COV-2 RNA SPEC QL NAA+PROBE: SIGNIFICANT CHANGE UP
SODIUM SERPL-SCNC: 142 MMOL/L — SIGNIFICANT CHANGE UP (ref 135–145)
TROPONIN T, HIGH SENSITIVITY RESULT: <6 NG/L — SIGNIFICANT CHANGE UP
WBC # BLD: 6.13 K/UL — SIGNIFICANT CHANGE UP (ref 3.8–10.5)
WBC # FLD AUTO: 6.13 K/UL — SIGNIFICANT CHANGE UP (ref 3.8–10.5)

## 2021-10-23 PROCEDURE — 71275 CT ANGIOGRAPHY CHEST: CPT | Mod: 26,MA

## 2021-10-23 PROCEDURE — 74177 CT ABD & PELVIS W/CONTRAST: CPT | Mod: 26,MA

## 2021-10-23 PROCEDURE — 71045 X-RAY EXAM CHEST 1 VIEW: CPT | Mod: 26

## 2021-10-23 PROCEDURE — 99285 EMERGENCY DEPT VISIT HI MDM: CPT

## 2021-10-23 RX ORDER — ACETAMINOPHEN 500 MG
650 TABLET ORAL ONCE
Refills: 0 | Status: COMPLETED | OUTPATIENT
Start: 2021-10-23 | End: 2021-10-23

## 2021-10-23 RX ORDER — LIDOCAINE 4 G/100G
1 CREAM TOPICAL ONCE
Refills: 0 | Status: COMPLETED | OUTPATIENT
Start: 2021-10-23 | End: 2021-10-23

## 2021-10-23 RX ADMIN — Medication 650 MILLIGRAM(S): at 16:28

## 2021-10-23 RX ADMIN — LIDOCAINE 1 PATCH: 4 CREAM TOPICAL at 16:27

## 2021-10-23 NOTE — ED CLERICAL - NS ED CLERK NOTE PRE-ARRIVAL INFORMATION; ADDITIONAL PRE-ARRIVAL INFORMATION
Patient sent in was getting an Iron infusion and developed severe back pain pt has a HX PE. sent in to [facility]. please call hematology  fellow.

## 2021-10-23 NOTE — ED PROVIDER NOTE - PROGRESS NOTE DETAILS
Richard Turner MD. pt reassessed and reevaluated. still has mild lower back pain but feels better since onset. CT negative for RP bleed or even PE. labs reviewed and discussed w/ patient. ED clerical note states to page hematology fellow. Hematology fellow paged,a waiting call back however, pt would want to go home now. I advised for pt to f/u with her hematologist. Instructed patient to follow up with their primary care physician. Return precautions provided. Allowed for questions and all questions answered. Pt to be discharged.

## 2021-10-23 NOTE — ED PROVIDER NOTE - PATIENT PORTAL LINK FT
You can access the FollowMyHealth Patient Portal offered by [facility] by registering at the following website: [email or web address]. By joining [facility]’s FollowMyHealth portal, you will also be able to view your health information using other applications (apps) compatible with our system.

## 2021-10-23 NOTE — ED PROVIDER NOTE - ATTENDING CONTRIBUTION TO CARE
I performed a face-to-face evaluation of the patient and performed a history and physical examination. I agree with the history and physical examination.    Ezra: Concern for spontaneous bleed in retroperitoneum or iliopsoas vs. recurrent PE. Check labs, CT angio PE, CT abd/pelvis non-con. Pain control.

## 2021-10-23 NOTE — ED PROVIDER NOTE - NSFOLLOWUPINSTRUCTIONS_ED_ALL_ED_FT
You were seen in the emergency department for lower back pain. You had CAT scans and blood tests performed.  Based on your CAT scans, you do not have any bleeding or a pulmonary embolism (blood clot in your lungs).    Please follow up with your hematologist as well as your primary care physician.     Please continue your medications as prescribed.    You may take Acetaminophen over the counter as needed for pain and/or fever. Use as directed and see medication warnings.    Please bring a copy of your results with you.    Please return to the emergency department for worsening of your symptoms.

## 2021-10-23 NOTE — ED PROVIDER NOTE - PHYSICAL EXAMINATION
Well appearing, well nourished, awake, alert, oriented to person, place, time/situation and in no apparent distress.    Airway patent    Eyes without scleral injection. No jaundice.    Strong pulse.    Respirations unlabored. Lungs clear.    Abdomen soft, non-tender, no guarding.    MSK: no pain w/ back TTP or motion.    Alert and oriented, no gross motor or sensory deficits.    Skin normal color for race, warm, dry and intact. No evidence of rash.    No SI/HI.

## 2021-10-23 NOTE — ED ADULT TRIAGE NOTE - CHIEF COMPLAINT QUOTE
from [facility]- receives iron infusions-- c/o pain lower back following infusion.  pain increases inspiration. / pmh- pe

## 2021-10-23 NOTE — ED PROVIDER NOTE - CLINICAL SUMMARY MEDICAL DECISION MAKING FREE TEXT BOX
Ezra: Concern for spontaneous bleed in retroperitoneum or iliopsoas vs. recurrent PE. Check labs, CT angio PE, CT abd/pelvis non-con. Pain control.

## 2021-10-23 NOTE — ED PROVIDER NOTE - NS ED MD EM SELECTION
Implemented All Universal Safety Interventions:  [place] to call system. Call bell, personal items and telephone within reach. Instruct patient to call for assistance. Room bathroom lighting operational. Non-slip footwear when patient is off stretcher. Physically safe environment: no spills, clutter or unnecessary equipment. Stretcher in lowest position, wheels locked, appropriate side rails in place.
[id number] Comprehensive

## 2021-10-23 NOTE — ED PROVIDER NOTE - OBJECTIVE STATEMENT
Ezra: H/o PE recently thought to be 2/2 OCPs. Started Xarelto. Had heavy VB. Now on iron infusions. Still on Xarelto. Today, 2 hrs after iron infusion started, felt pain in mid-R back area. Pleuritic. Not TTP or change w/ movement. Feels internal. No CP/SOB/F.

## 2021-10-28 ENCOUNTER — OUTPATIENT (OUTPATIENT)
Dept: OUTPATIENT SERVICES | Facility: HOSPITAL | Age: 41
LOS: 1 days | Discharge: HOME | End: 2021-10-28

## 2021-10-28 DIAGNOSIS — I26.99 OTHER PULMONARY EMBOLISM WITHOUT ACUTE COR PULMONALE: ICD-10-CM

## 2021-10-30 ENCOUNTER — APPOINTMENT (OUTPATIENT)
Dept: INFUSION THERAPY | Facility: HOSPITAL | Age: 41
End: 2021-10-30

## 2021-11-06 ENCOUNTER — APPOINTMENT (OUTPATIENT)
Dept: INFUSION THERAPY | Facility: HOSPITAL | Age: 41
End: 2021-11-06

## 2021-11-13 ENCOUNTER — APPOINTMENT (OUTPATIENT)
Dept: INFUSION THERAPY | Facility: HOSPITAL | Age: 41
End: 2021-11-13

## 2021-12-07 ENCOUNTER — OUTPATIENT (OUTPATIENT)
Dept: OUTPATIENT SERVICES | Facility: HOSPITAL | Age: 41
LOS: 1 days | Discharge: HOME | End: 2021-12-07

## 2021-12-07 DIAGNOSIS — I26.99 OTHER PULMONARY EMBOLISM WITHOUT ACUTE COR PULMONALE: ICD-10-CM

## 2021-12-08 ENCOUNTER — RESULT REVIEW (OUTPATIENT)
Age: 41
End: 2021-12-08

## 2021-12-08 ENCOUNTER — APPOINTMENT (OUTPATIENT)
Dept: VASCULAR SURGERY | Facility: CLINIC | Age: 41
End: 2021-12-08
Payer: COMMERCIAL

## 2021-12-08 ENCOUNTER — APPOINTMENT (OUTPATIENT)
Dept: HEMATOLOGY ONCOLOGY | Facility: CLINIC | Age: 41
End: 2021-12-08
Payer: COMMERCIAL

## 2021-12-08 VITALS
DIASTOLIC BLOOD PRESSURE: 73 MMHG | HEART RATE: 69 BPM | BODY MASS INDEX: 27.04 KG/M2 | OXYGEN SATURATION: 99 % | TEMPERATURE: 98.4 F | RESPIRATION RATE: 15 BRPM | WEIGHT: 148.81 LBS | HEIGHT: 62.2 IN | SYSTOLIC BLOOD PRESSURE: 110 MMHG

## 2021-12-08 DIAGNOSIS — I87.2 VENOUS INSUFFICIENCY (CHRONIC) (PERIPHERAL): ICD-10-CM

## 2021-12-08 DIAGNOSIS — I77.4 CELIAC ARTERY COMPRESSION SYNDROME: ICD-10-CM

## 2021-12-08 LAB
BASOPHILS # BLD AUTO: 0.05 K/UL — SIGNIFICANT CHANGE UP (ref 0–0.2)
BASOPHILS NFR BLD AUTO: 0.8 % — SIGNIFICANT CHANGE UP (ref 0–2)
EOSINOPHIL # BLD AUTO: 0.11 K/UL — SIGNIFICANT CHANGE UP (ref 0–0.5)
EOSINOPHIL NFR BLD AUTO: 1.8 % — SIGNIFICANT CHANGE UP (ref 0–6)
HCT VFR BLD CALC: 42.2 % — SIGNIFICANT CHANGE UP (ref 34.5–45)
HGB BLD-MCNC: 14 G/DL — SIGNIFICANT CHANGE UP (ref 11.5–15.5)
IMM GRANULOCYTES NFR BLD AUTO: 2.8 % — HIGH (ref 0–1.5)
LYMPHOCYTES # BLD AUTO: 2.35 K/UL — SIGNIFICANT CHANGE UP (ref 1–3.3)
LYMPHOCYTES # BLD AUTO: 39.4 % — SIGNIFICANT CHANGE UP (ref 13–44)
MCHC RBC-ENTMCNC: 26.9 PG — LOW (ref 27–34)
MCHC RBC-ENTMCNC: 33.2 G/DL — SIGNIFICANT CHANGE UP (ref 32–36)
MCV RBC AUTO: 81.2 FL — SIGNIFICANT CHANGE UP (ref 80–100)
MONOCYTES # BLD AUTO: 0.36 K/UL — SIGNIFICANT CHANGE UP (ref 0–0.9)
MONOCYTES NFR BLD AUTO: 6 % — SIGNIFICANT CHANGE UP (ref 2–14)
NEUTROPHILS # BLD AUTO: 2.93 K/UL — SIGNIFICANT CHANGE UP (ref 1.8–7.4)
NEUTROPHILS NFR BLD AUTO: 49.2 % — SIGNIFICANT CHANGE UP (ref 43–77)
NRBC # BLD: 0 /100 WBCS — SIGNIFICANT CHANGE UP (ref 0–0)
PLATELET # BLD AUTO: 222 K/UL — SIGNIFICANT CHANGE UP (ref 150–400)
RBC # BLD: 5.2 M/UL — SIGNIFICANT CHANGE UP (ref 3.8–5.2)
RBC # FLD: 17.2 % — HIGH (ref 10.3–14.5)
WBC # BLD: 5.97 K/UL — SIGNIFICANT CHANGE UP (ref 3.8–10.5)
WBC # FLD AUTO: 5.97 K/UL — SIGNIFICANT CHANGE UP (ref 3.8–10.5)

## 2021-12-08 PROCEDURE — 99213 OFFICE O/P EST LOW 20 MIN: CPT

## 2021-12-08 PROCEDURE — 99215 OFFICE O/P EST HI 40 MIN: CPT | Mod: 95

## 2021-12-08 NOTE — RESULTS/DATA
[FreeTextEntry1] : 12/8/2021\par Hgb 14\par \par \par CBC today\par WBC 6.23\par HGB 10.3\par HCT 31.1\par ,000\par MCV 86.4\par

## 2021-12-08 NOTE — PHYSICAL EXAM
[Fully active, able to carry on all pre-disease performance without restriction] : Status 0 - Fully active, able to carry on all pre-disease performance without restriction [Normal] : affect appropriate [de-identified] : No cervical, axillary or inguinal LAD noted

## 2021-12-08 NOTE — PHYSICAL EXAM
[No Rash or Lesion] : No rash or lesion [Alert] : alert [Oriented to Person] : oriented to person [Oriented to Place] : oriented to place [Oriented to Time] : oriented to time [Calm] : calm [JVD] : no jugular venous distention  [de-identified] : nad [de-identified] : wnl [de-identified] : no resp distress [FreeTextEntry1] : Physical exam and extremities exam findings via telehealth video review with patient\par \par  [de-identified] : Ashok Cranial nerves 2-12 ashok grossly intact [de-identified] : cooperative

## 2021-12-08 NOTE — REASON FOR VISIT
[Home] : at home, [unfilled] , at the time of the visit. [Medical Office: ([facility])___] : at the medical office located in  [Other:____] : [unfilled] [Verbal consent obtained from patient] : the patient, [unfilled] [FreeTextEntry1] : i have dizzy spells

## 2021-12-08 NOTE — HISTORY OF PRESENT ILLNESS
[de-identified] : CHART REVIEW\par \par CC:\par PE, hypercoagulable work-up, determine length of treatment\par \par HPI:\par 41 year old woman with no significant past medical history here for evaluation of hypercoagulability and to determine length of treatment after recent diagnosis of pulmonary embolism.  Patient was in her usual state of health until the beginning of 2021 when she developed chest pain, SOB, tachycardia and dizziness.  Patient was on oral contraceptives since 3/2021 for perimenopausal symptoms.  She denied any prior long car/airplane travel, trauma or illness.  She presented to [facility] 2021 and CTA 2021 showed RLL segmental and subsegmental PEs, flattening and narrowing of celiac artery at ostium.  Patient was discharged to home on Xarelto 15mg po BID.  She returned to [facility] ED on 2021 with heavy menstrual bleeding X 3 weeks, lightheadedness, palpitations and syncopal episode.  Her hemoglobin was found to be 10.6.  Prior hemoglobin was 14.3 on 2021.  She was still taking oral contraceptives which was subsequently discontinued.  She saw vascular on 2021 and was advised no further intervention at this time.  She had a Doppler ultrasound yesterday that was negative for DVT.  \par \par She c/o fatigue. She reports occasional chest pain, SOB upon exertion, palpitations, dizziness/lightheadedness.  She feels better overall.  She is still having menstrual bleeding with clots.  She has follow-up with gynecology on 2021.  Patient denies any fever/chills, recent infections, SOB at rest, abdominal pain, n/v/d, bloody/black stools, night sweats, swollen glands or any unexplained bleeding/bruising. She has remote history of taking oral iron in the past; she was unable to tolerate secondary to severe constipation. 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 \par LMP 2020.  Denies vaginal bleeding from 2020-2021 was on oral contraceptives. \par Heavy bleeding since 8/10/2021 since start of Xarelto. Passing lots of clots and needs to change maxi pad every 2 hours. \par History of heavy menses in her 20's and 30's.  She received IV iron 2 times per year X 4 years in her 30's. \par \par Hospitalizations:\par As per HPI\par For surgeries and childbirth\par \par Medications:\par See List.  Medications reconciled\par Xarelto 20mg po daily\par Probiotics daily\par Tylenol PRN\par \par Allergies:\par Reglan, causes itching \par \par Social History:\par Single, never been . 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 \par Rare alcohol use. \par Denies smoking. \[place] \par Typically runs 3 miles a few times per week\par \par Family History:\par Mother alive with osteoporosis. \par Father alive and well. \par 3 sisters, all alive and well\par Daughter, alive and well. \par Denies family history of DVT, PE, CVA at early age or miscarriage late in pregnancy. \par \par Healthcare Maintenance:\par Primary care doctor- Dr. Mary Chambers- last seen 2021\par Last colonoscopy/ endoscopy- 2021-2021- both normal\par Last mammogram 2021 normal\par Last gynecology 2021\par Denies COVID vaccine \par History of COVID infection 2020- mild symptoms- aches, fever/chills, not hospitalized\par Vascular surgery visit 2021 [de-identified] : Initial Visit

## 2021-12-08 NOTE — REVIEW OF SYSTEMS
[Patient Intake Form Reviewed] : Patient intake form was reviewed [Fatigue] : fatigue [Chest Pain] : chest pain [Palpitations] : palpitations [SOB on Exertion] : shortness of breath during exertion [Dysmenorrhea/Abn Vaginal Bleeding] : dysmenorrhea/abnormal vaginal bleeding [Dizziness] : dizziness [Negative] : Allergic/Immunologic [de-identified] : occasional headaches [de-identified] : feeling cold

## 2021-12-08 NOTE — ASSESSMENT
[FreeTextEntry1] : 41 year old woman with no significant past medical history here for evaluation of hypercoagulability and to determine length of treatment after pulmonary embolism. Patient also history of heavy menstrual bleeding leading to NICHOLE. She has completed 3 months of AC and has received 3-4 doses of Venofer. Tete H/H has normalized. \par \par \par \par # PE RLE, provoked episode \par - Completed 3 months of AC\par - Can stop taking Xarelto \par - Explained the risk of recurrence \par - Watch for .leg arm swelling and pain\par - Watch for SOB and chest pain \par - Report to ED in such cases \par \par # Iron deficiency anemia\par - S/P IV iron \par - H/H  normalized \par - No intervention needed at this time \par - Will check Iron, ferritin today \par -  RTC in 3 months\par \par

## 2021-12-08 NOTE — HISTORY OF PRESENT ILLNESS
[FreeTextEntry1] : past several weeks  onset of dizzy spells duration 2-5 min about 2/day\par pt noticed heart racing  w these events\par pt f/u at [facility] aug 9 2021  w dizziness sob and chest pain\par pt states  vaginal bleeding ans spotting  described as heavy  since start of xarelto \par ctpa  sig for PE RLL PE and celiac artery stenosis\par pt started on xarelto 15 bid to be transitioned to  20 daily \par pt states that she is perimenopausal last menstrual period Nov 22 2020 \par last  gyn check up May 2021\par pt presented  [facility] ED Aug 29 2021 passed out at home from additional vaginal bleeding\par eval w us\par pt was not transfused \par pt in OCP for  perimenopausal sx  since march 2021 which were d/c sat aug 28 2021\par \par pt denies any previous le c/o\par pt denies previous le thrombophilic events or trauma \par pt is not a smoker\par pt is a  \par \par pt also underwent a holter monitor for 5 days 1 mo ago  before the aug 9 2021 event in late july 2021\par as per pt  w/u was neg \par \par pt states some recent  planned wt loss \par  [de-identified] : pt has d/c ocp\par pt has f/u w her gyn and received a w/u which was neg and is planning f/u gyn normal surveillance\par pt states no additional  vaginal bleeding\par pt received  iron infusion and was  re eval as in pt or in the ed  at [facility] after  this w/u was neg\par pt f/u w pulmonary   f/u ctpa  was neg and is completiong  po anticoag rx in  2-3 days\par hypercoag w/u as per pt was neg \par pt states no additional le c/o  or abd c/o

## 2021-12-08 NOTE — ASSESSMENT
[Arterial/Venous Disease] : arterial/venous disease [Medication Management] : medication management [FreeTextEntry1] : Impression venous insuff not yet sono evident clinically stable\par \par Plan Med Conservative management  knee high or pantyhose comp stockings 20-30mm Hg  for exercise routine (rx mailed to pt)\par complete po anticoag rx as per pulm and  from vasc surg standpoint  recommended transition to baby asa  81 daily for the next 6-12mo unless there is pulm or heme objection\par f/u w heme prn \par f/u w pulm \par f/u gyn \par  d/w pt celiac artery  comp syndrome  as a condition, w/u and treatment options\par pt is currently  asymptomatic and this was an incidental finding  however the Abd us is sig for 75% compression\par rto for abd us s/o celiac artery compression  in 12mo September 2022 then telehealth\par rto or call if any issues\par Telehealth visit  time duration 41  min\par \par \par \par Varicose veins are enlarged, twisted veins. Varicose veins are caused by increased blood pressure in the veins.  The blood moves towards the heart by 1-way valves in the veins. When the valves become weakened or damaged, blood can collect in the veins and pool in your lower legs (ankles). This causes the veins to become enlarged and incompetent with reflux. Sitting or standing for long periods can cause blood to pool in the leg veins, increasing the pressure within the veins. \par Risk factors for varicose veins or venous disease may include:  obesity, older age, standing or sitting for prolonged periods of time for several years, being female, pregnancy, taking oral contraceptive pills or hormone replacement, being inactive, and/or smoking. \par The most common symptoms of varicose veins are sensations in the legs, such as a heavy feeling, burning, and/or aching. However, each individual may experience symptoms differently.  Other symptoms may include:  color changes in the skin, sores on the legs, or rash.  Severe varicose veins or venous disease may eventually produce long-term mild swelling that can result in more serious skin and tissue problems, such as ulcers and non-healing sores.\par Varicose veins and venous disease are diagnosed by a complete medical history, physical examination, and diagnostic studies for varicose veins including duplex ultrasound and color-flow imaging.  \par Medical treatment for varicose veins and venous disease include:  compression stockings, sclerotherapy, endovenous ablation and/or surgical treatment with microphlebectomy.  \par \par  [Other: _____] : [unfilled]

## 2021-12-09 LAB
FERRITIN SERPL-MCNC: 88 NG/ML
IRON SATN MFR SERPL: 23 %
IRON SERPL-MCNC: 93 UG/DL
TIBC SERPL-MCNC: 402 UG/DL
UIBC SERPL-MCNC: 310 UG/DL

## 2022-03-28 ENCOUNTER — OUTPATIENT (OUTPATIENT)
Dept: OUTPATIENT SERVICES | Facility: HOSPITAL | Age: 42
LOS: 1 days | Discharge: HOME | End: 2022-03-28

## 2022-03-28 DIAGNOSIS — I26.90 SEPTIC PULMONARY EMBOLISM WITHOUT ACUTE COR PULMONALE: ICD-10-CM

## 2022-04-01 ENCOUNTER — RESULT REVIEW (OUTPATIENT)
Age: 42
End: 2022-04-01

## 2022-04-01 ENCOUNTER — APPOINTMENT (OUTPATIENT)
Dept: HEMATOLOGY ONCOLOGY | Facility: CLINIC | Age: 42
End: 2022-04-01
Payer: COMMERCIAL

## 2022-04-01 VITALS
OXYGEN SATURATION: 99 % | RESPIRATION RATE: 16 BRPM | HEART RATE: 69 BPM | SYSTOLIC BLOOD PRESSURE: 116 MMHG | TEMPERATURE: 96.8 F | WEIGHT: 157.41 LBS | BODY MASS INDEX: 28.61 KG/M2 | DIASTOLIC BLOOD PRESSURE: 73 MMHG

## 2022-04-01 DIAGNOSIS — I26.99 OTHER PULMONARY EMBOLISM W/OUT ACUTE COR PULMONALE: ICD-10-CM

## 2022-04-01 DIAGNOSIS — D50.9 IRON DEFICIENCY ANEMIA, UNSPECIFIED: ICD-10-CM

## 2022-04-01 LAB
BASOPHILS # BLD AUTO: 0.02 K/UL — SIGNIFICANT CHANGE UP (ref 0–0.2)
BASOPHILS NFR BLD AUTO: 0.4 % — SIGNIFICANT CHANGE UP (ref 0–2)
EOSINOPHIL # BLD AUTO: 0.15 K/UL — SIGNIFICANT CHANGE UP (ref 0–0.5)
EOSINOPHIL NFR BLD AUTO: 2.8 % — SIGNIFICANT CHANGE UP (ref 0–6)
HCT VFR BLD CALC: 42.8 % — SIGNIFICANT CHANGE UP (ref 34.5–45)
HGB BLD-MCNC: 13.8 G/DL — SIGNIFICANT CHANGE UP (ref 11.5–15.5)
IMM GRANULOCYTES NFR BLD AUTO: 0.2 % — SIGNIFICANT CHANGE UP (ref 0–1.5)
LYMPHOCYTES # BLD AUTO: 2.3 K/UL — SIGNIFICANT CHANGE UP (ref 1–3.3)
LYMPHOCYTES # BLD AUTO: 42.8 % — SIGNIFICANT CHANGE UP (ref 13–44)
MCHC RBC-ENTMCNC: 29.2 PG — SIGNIFICANT CHANGE UP (ref 27–34)
MCHC RBC-ENTMCNC: 32.2 G/DL — SIGNIFICANT CHANGE UP (ref 32–36)
MCV RBC AUTO: 90.7 FL — SIGNIFICANT CHANGE UP (ref 80–100)
MONOCYTES # BLD AUTO: 0.39 K/UL — SIGNIFICANT CHANGE UP (ref 0–0.9)
MONOCYTES NFR BLD AUTO: 7.3 % — SIGNIFICANT CHANGE UP (ref 2–14)
NEUTROPHILS # BLD AUTO: 2.5 K/UL — SIGNIFICANT CHANGE UP (ref 1.8–7.4)
NEUTROPHILS NFR BLD AUTO: 46.5 % — SIGNIFICANT CHANGE UP (ref 43–77)
NRBC # BLD: 0 /100 WBCS — SIGNIFICANT CHANGE UP (ref 0–0)
PLATELET # BLD AUTO: 206 K/UL — SIGNIFICANT CHANGE UP (ref 150–400)
RBC # BLD: 4.72 M/UL — SIGNIFICANT CHANGE UP (ref 3.8–5.2)
RBC # FLD: 12.4 % — SIGNIFICANT CHANGE UP (ref 10.3–14.5)
WBC # BLD: 5.37 K/UL — SIGNIFICANT CHANGE UP (ref 3.8–10.5)
WBC # FLD AUTO: 5.37 K/UL — SIGNIFICANT CHANGE UP (ref 3.8–10.5)

## 2022-04-01 PROCEDURE — 99213 OFFICE O/P EST LOW 20 MIN: CPT

## 2022-04-01 NOTE — PHYSICAL EXAM
[Fully active, able to carry on all pre-disease performance without restriction] : Status 0 - Fully active, able to carry on all pre-disease performance without restriction [Normal] : affect appropriate [de-identified] : No cervical, axillary or inguinal LAD noted

## 2022-04-01 NOTE — ASSESSMENT
[FreeTextEntry1] : 41 year old woman with no significant past medical history here for evaluation of hypercoagulability and to determine length of treatment after pulmonary embolism. She did not have any genetic risk.She completed 3 months of AC. Patient also history of heavy menstrual bleeding leading to NICHOLE. She has completed 3 months of AC and has received 3-4 doses of Venofer. Her H/H has normalized. \par \par \par \par # PE RLE, provoked episode \par - Completed 3 months of AC\par - Can stop taking Xarelto \par - FVL and PGM: Negative\par - Explained the risk of recurrence \par - Watch for .leg arm swelling and pain\par - Watch for SOB and chest pain \par - Report to ED in such cases \par \par # Iron deficiency anemia\par - S/P IV iron \par - H/H  normalized \par - No intervention needed at this time \par - Will check Iron, ferritin today \par - RTC in 3 months\par \par

## 2022-04-01 NOTE — HISTORY OF PRESENT ILLNESS
[de-identified] : CHART REVIEW\par \par CC:\par PE, hypercoagulable work-up, determine length of treatment\par \par HPI:\par 41 year old woman with no significant past medical history here for evaluation of hypercoagulability and to determine length of treatment after recent diagnosis of pulmonary embolism.  Patient was in her usual state of health until the beginning of 2021 when she developed chest pain, SOB, tachycardia and dizziness.  Patient was on oral contraceptives since 3/2021 for perimenopausal symptoms.  She denied any prior long car/airplane travel, trauma or illness.  She presented to [facility] 2021 and CTA 2021 showed RLL segmental and subsegmental PEs, flattening and narrowing of celiac artery at ostium.  Patient was discharged to home on Xarelto 15mg po BID.  She returned to [facility] ED on 2021 with heavy menstrual bleeding X 3 weeks, lightheadedness, palpitations and syncopal episode.  Her hemoglobin was found to be 10.6.  Prior hemoglobin was 14.3 on 2021.  She was still taking oral contraceptives which was subsequently discontinued.  She saw vascular on 2021 and was advised no further intervention at this time.  She had a Doppler ultrasound yesterday that was negative for DVT.  \par \par She c/o fatigue. She reports occasional chest pain, SOB upon exertion, palpitations, dizziness/lightheadedness.  She feels better overall.  She is still having menstrual bleeding with clots.  She has follow-up with gynecology on 2021.  Patient denies any fever/chills, recent infections, SOB at rest, abdominal pain, n/v/d, bloody/black stools, night sweats, swollen glands or any unexplained bleeding/bruising. She has remote history of taking oral iron in the past; she was unable to tolerate secondary to severe constipation. 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 \par LMP 2020.  Denies vaginal bleeding from 2020-2021 was on oral contraceptives. \par Heavy bleeding since 8/10/2021 since start of Xarelto. Passing lots of clots and needs to change maxi pad every 2 hours. \par History of heavy menses in her 20's and 30's.  She received IV iron 2 times per year X 4 years in her 30's. \par \par Hospitalizations:\par As per HPI\par For surgeries and childbirth\par \par Medications:\par See List.  Medications reconciled\par Xarelto 20mg po daily\par Probiotics daily\par Tylenol PRN\par \par Allergies:\par Reglan, causes itching \par \par Social History:\par Single, never been . 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 \par Rare alcohol use. \par Denies smoking. \par [place] in [place] \par Typically runs 3 miles a few times per week\par \par Family History:\par Mother alive with osteoporosis. \par Father alive and well. \par 3 sisters, all alive and well\par Daughter, alive and well. \par Denies family history of DVT, PE, CVA at early age or miscarriage late in pregnancy. \par \par Healthcare Maintenance:\par Primary care doctor- Dr. Mary Chambers- last seen 2021\par Last colonoscopy/ endoscopy- 2021-2021- both normal\par Last mammogram 2021 normal\par Last gynecology 2021\par Denies COVID vaccine \par History of COVID infection 2020- mild symptoms- aches, fever/chills, not hospitalized\par Vascular surgery visit 2021 [de-identified] : 4/1/2022\par Patient presents for a follow up. She appears well and feels fine. Her H/H returned to high normal after IV iron replacement. her last iron studies were normal. She is not having heavy periods any more. Denies any symptoms suspicious for VTE. She has no complaints.

## 2022-04-01 NOTE — REVIEW OF SYSTEMS
[Patient Intake Form Reviewed] : Patient intake form was reviewed [Fatigue] : fatigue [Chest Pain] : chest pain [Palpitations] : palpitations [SOB on Exertion] : shortness of breath during exertion [Dysmenorrhea/Abn Vaginal Bleeding] : dysmenorrhea/abnormal vaginal bleeding [Dizziness] : dizziness [Negative] : Allergic/Immunologic [de-identified] : occasional headaches [de-identified] : feeling cold

## 2022-04-04 LAB
DEPRECATED D DIMER PPP IA-ACNC: <150 NG/ML DDU
FERRITIN SERPL-MCNC: 51 NG/ML

## 2022-07-12 NOTE — ED ADULT NURSE NOTE - NS PRO PASSIVE SMOKE EXP
"*Care resumed for Pt. 0700 until discharge at 1100.     Pt. A&Ox4. VSS throughout shift, Pain 17-3/10 throughout shift. Ice to R hip and thigh for pain management Percocet x1 prior to discharge this afternoon. Pt. Also has scheduled Tylenol, gabapentin, and robaxin. Afebrile and stable on room air. IS at bedside. Pt. Had 1 small loose stools this AM, Prn imodium given x1. Negative c-diff. Continent of urine,  utilizing urinal at bedside. Regular diet. Up with A1 with gait belt and walker. PT/OT/SW and WOC following. Pt. Is on K+ replacement stable at 3.6 this AM. BS stable throughout the day, 126 this AM and spot checked at 303 this afternoon prior discharge. Pt. Plans to eat lunch at facility. Sween cream to heels, keep heels elevated on pillows at all times. Antifungal cream to perineum. PIV SL. Plans for Pt. To transition to [facility] at 11 am today via [facility] wheelchair transport. Nursing to continue to assess Pt. And provide supportive cares.     /66 (BP Location: Right arm, Patient Position: Supine, Cuff Size: Adult Small)   Pulse 84   Temp 98.1  F (36.7  C) (Oral)   Resp 17   Ht 1.626 m (5' 4\")   Wt 69.8 kg (153 lb 14.4 oz)   SpO2 98%   BMI 26.42 kg/m                          " No

## 2022-08-30 ENCOUNTER — OUTPATIENT (OUTPATIENT)
Dept: OUTPATIENT SERVICES | Facility: HOSPITAL | Age: 42
LOS: 1 days | Discharge: HOME | End: 2022-08-30

## 2022-08-30 DIAGNOSIS — I26.90 SEPTIC PULMONARY EMBOLISM WITHOUT ACUTE COR PULMONALE: ICD-10-CM

## 2022-09-01 ENCOUNTER — APPOINTMENT (OUTPATIENT)
Dept: HEMATOLOGY ONCOLOGY | Facility: CLINIC | Age: 42
End: 2022-09-01

## 2022-10-03 ENCOUNTER — APPOINTMENT (OUTPATIENT)
Dept: HEMATOLOGY ONCOLOGY | Facility: CLINIC | Age: 42
End: 2022-10-03

## 2022-10-11 ENCOUNTER — APPOINTMENT (OUTPATIENT)
Dept: VASCULAR SURGERY | Facility: CLINIC | Age: 42
End: 2022-10-11

## 2022-10-12 ENCOUNTER — APPOINTMENT (OUTPATIENT)
Dept: VASCULAR SURGERY | Facility: CLINIC | Age: 42
End: 2022-10-12

## 2024-09-28 NOTE — PHYSICAL EXAM
[Normal Breath Sounds] : Normal breath sounds [1+] : left 1+ [2+] : left 2+ [Ankle Swelling (On Exam)] : present [Ankle Swelling Bilaterally] : bilaterally  [] : bilaterally [Ankle Swelling On The Right] : mild [No HSM] : no hepatosplenomegaly [No Rash or Lesion] : No rash or lesion [Alert] : alert [Oriented to Person] : oriented to person [Oriented to Place] : oriented to place [Oriented to Time] : oriented to time [Calm] : calm [JVD] : no jugular venous distention  [Right Carotid Bruit] : no bruit heard over the right carotid [Left Carotid Bruit] : no bruit heard over the left carotid [Abdomen Masses] : No abdominal masses [de-identified] : nad [de-identified] : wnl [FreeTextEntry1] : Mild bilateral  leg venous insufficiency \par and mild bilateral leg edema \par Multiple  bilateral leg small varicose  veins and spider veins  ant post medial calf and shin \par RLE Varicose veins measuring  1-2 mm in size on the calf /shin \par LLE Varicose veins measuring  1-2 mm in size on the calf /shin \par no wounds/ulcers\par \par  [de-identified] : soft nt nd  [de-identified] : wnl [de-identified] : Ashok Cranial nerves 2-12 ashok grossly intact [de-identified] : cooperative Walking